# Patient Record
Sex: MALE | Race: WHITE | Employment: OTHER | URBAN - NONMETROPOLITAN AREA
[De-identification: names, ages, dates, MRNs, and addresses within clinical notes are randomized per-mention and may not be internally consistent; named-entity substitution may affect disease eponyms.]

---

## 2024-04-21 ENCOUNTER — APPOINTMENT (OUTPATIENT)
Dept: GENERAL RADIOLOGY | Age: 84
DRG: 378 | End: 2024-04-21
Attending: INTERNAL MEDICINE
Payer: MEDICARE

## 2024-04-21 ENCOUNTER — HOSPITAL ENCOUNTER (INPATIENT)
Age: 84
LOS: 2 days | Discharge: HOME | DRG: 378 | End: 2024-04-23
Attending: INTERNAL MEDICINE | Admitting: INTERNAL MEDICINE
Payer: MEDICARE

## 2024-04-21 DIAGNOSIS — D62 ACUTE BLOOD LOSS ANEMIA: Primary | ICD-10-CM

## 2024-04-21 LAB
ABO: NORMAL
ALBUMIN SERPL BCG-MCNC: 2.3 G/DL (ref 3.5–5.1)
ALP SERPL-CCNC: 49 U/L (ref 38–126)
ALT SERPL W/O P-5'-P-CCNC: 9 U/L (ref 11–66)
ANION GAP SERPL CALC-SCNC: 8 MEQ/L (ref 8–16)
ANTIBODY SCREEN: NORMAL
AST SERPL-CCNC: 14 U/L (ref 5–40)
BILIRUB SERPL-MCNC: 1.1 MG/DL (ref 0.3–1.2)
BUN SERPL-MCNC: 24 MG/DL (ref 7–22)
CA-I BLD ISE-SCNC: 1.07 MMOL/L (ref 1.12–1.32)
CALCIUM SERPL-MCNC: 7.3 MG/DL (ref 8.5–10.5)
CHLORIDE SERPL-SCNC: 102 MEQ/L (ref 98–111)
CO2 SERPL-SCNC: 22 MEQ/L (ref 23–33)
CREAT SERPL-MCNC: 0.5 MG/DL (ref 0.4–1.2)
FERRITIN SERPL IA-MCNC: 220 NG/ML (ref 22–322)
GFR SERPL CREATININE-BSD FRML MDRD: > 90 ML/MIN/1.73M2
GLUCOSE SERPL-MCNC: 90 MG/DL (ref 70–108)
IRON SATN MFR SERPL: 16 % (ref 20–50)
IRON SERPL-MCNC: 28 UG/DL (ref 65–195)
LACTATE SERPL-SCNC: 1.1 MMOL/L (ref 0.5–2)
MAGNESIUM SERPL-MCNC: 2.1 MG/DL (ref 1.6–2.4)
OSMOLALITY SERPL: 282 MOSMOL/KG (ref 275–295)
POTASSIUM SERPL-SCNC: 4 MEQ/L (ref 3.5–5.2)
PROT SERPL-MCNC: 5 G/DL (ref 6.1–8)
RH FACTOR: NORMAL
SODIUM SERPL-SCNC: 132 MEQ/L (ref 135–145)
TIBC SERPL-MCNC: 179 UG/DL (ref 171–450)
TROPONIN, HIGH SENSITIVITY: 16 NG/L (ref 0–12)

## 2024-04-21 PROCEDURE — 85025 COMPLETE CBC W/AUTO DIFF WBC: CPT

## 2024-04-21 PROCEDURE — 71045 X-RAY EXAM CHEST 1 VIEW: CPT

## 2024-04-21 PROCEDURE — 99223 1ST HOSP IP/OBS HIGH 75: CPT

## 2024-04-21 PROCEDURE — 83550 IRON BINDING TEST: CPT

## 2024-04-21 PROCEDURE — 83935 ASSAY OF URINE OSMOLALITY: CPT

## 2024-04-21 PROCEDURE — 81001 URINALYSIS AUTO W/SCOPE: CPT

## 2024-04-21 PROCEDURE — C9113 INJ PANTOPRAZOLE SODIUM, VIA: HCPCS

## 2024-04-21 PROCEDURE — P9016 RBC LEUKOCYTES REDUCED: HCPCS

## 2024-04-21 PROCEDURE — 83605 ASSAY OF LACTIC ACID: CPT

## 2024-04-21 PROCEDURE — 83930 ASSAY OF BLOOD OSMOLALITY: CPT

## 2024-04-21 PROCEDURE — 6360000002 HC RX W HCPCS

## 2024-04-21 PROCEDURE — 36415 COLL VENOUS BLD VENIPUNCTURE: CPT

## 2024-04-21 PROCEDURE — 86850 RBC ANTIBODY SCREEN: CPT

## 2024-04-21 PROCEDURE — 1200000003 HC TELEMETRY R&B

## 2024-04-21 PROCEDURE — 84300 ASSAY OF URINE SODIUM: CPT

## 2024-04-21 PROCEDURE — 84484 ASSAY OF TROPONIN QUANT: CPT

## 2024-04-21 PROCEDURE — A4216 STERILE WATER/SALINE, 10 ML: HCPCS

## 2024-04-21 PROCEDURE — 82728 ASSAY OF FERRITIN: CPT

## 2024-04-21 PROCEDURE — 82330 ASSAY OF CALCIUM: CPT

## 2024-04-21 PROCEDURE — 80053 COMPREHEN METABOLIC PANEL: CPT

## 2024-04-21 PROCEDURE — 30233N1 TRANSFUSION OF NONAUTOLOGOUS RED BLOOD CELLS INTO PERIPHERAL VEIN, PERCUTANEOUS APPROACH: ICD-10-PCS | Performed by: INTERNAL MEDICINE

## 2024-04-21 PROCEDURE — 93005 ELECTROCARDIOGRAM TRACING: CPT

## 2024-04-21 PROCEDURE — 86901 BLOOD TYPING SEROLOGIC RH(D): CPT

## 2024-04-21 PROCEDURE — 83540 ASSAY OF IRON: CPT

## 2024-04-21 PROCEDURE — 86923 COMPATIBILITY TEST ELECTRIC: CPT

## 2024-04-21 PROCEDURE — 2580000003 HC RX 258

## 2024-04-21 PROCEDURE — 83735 ASSAY OF MAGNESIUM: CPT

## 2024-04-21 PROCEDURE — 86900 BLOOD TYPING SEROLOGIC ABO: CPT

## 2024-04-21 RX ORDER — ONDANSETRON 2 MG/ML
4 INJECTION INTRAMUSCULAR; INTRAVENOUS EVERY 6 HOURS PRN
Status: DISCONTINUED | OUTPATIENT
Start: 2024-04-21 | End: 2024-04-23 | Stop reason: HOSPADM

## 2024-04-21 RX ORDER — SODIUM CHLORIDE 0.9 % (FLUSH) 0.9 %
5-40 SYRINGE (ML) INJECTION EVERY 12 HOURS SCHEDULED
Status: DISCONTINUED | OUTPATIENT
Start: 2024-04-21 | End: 2024-04-23 | Stop reason: HOSPADM

## 2024-04-21 RX ORDER — SODIUM CHLORIDE 9 MG/ML
INJECTION, SOLUTION INTRAVENOUS PRN
Status: DISCONTINUED | OUTPATIENT
Start: 2024-04-21 | End: 2024-04-23 | Stop reason: HOSPADM

## 2024-04-21 RX ORDER — ACETAMINOPHEN 650 MG/1
650 SUPPOSITORY RECTAL EVERY 6 HOURS PRN
Status: DISCONTINUED | OUTPATIENT
Start: 2024-04-21 | End: 2024-04-23 | Stop reason: HOSPADM

## 2024-04-21 RX ORDER — ACETAMINOPHEN 325 MG/1
650 TABLET ORAL EVERY 6 HOURS PRN
Status: DISCONTINUED | OUTPATIENT
Start: 2024-04-21 | End: 2024-04-23 | Stop reason: HOSPADM

## 2024-04-21 RX ORDER — ONDANSETRON 4 MG/1
4 TABLET, ORALLY DISINTEGRATING ORAL EVERY 8 HOURS PRN
Status: DISCONTINUED | OUTPATIENT
Start: 2024-04-21 | End: 2024-04-23 | Stop reason: HOSPADM

## 2024-04-21 RX ORDER — SODIUM CHLORIDE, SODIUM LACTATE, POTASSIUM CHLORIDE, CALCIUM CHLORIDE 600; 310; 30; 20 MG/100ML; MG/100ML; MG/100ML; MG/100ML
INJECTION, SOLUTION INTRAVENOUS CONTINUOUS
Status: DISCONTINUED | OUTPATIENT
Start: 2024-04-21 | End: 2024-04-23 | Stop reason: HOSPADM

## 2024-04-21 RX ORDER — SODIUM CHLORIDE 0.9 % (FLUSH) 0.9 %
5-40 SYRINGE (ML) INJECTION PRN
Status: DISCONTINUED | OUTPATIENT
Start: 2024-04-21 | End: 2024-04-23 | Stop reason: HOSPADM

## 2024-04-21 RX ORDER — CALCIUM GLUCONATE 20 MG/ML
2000 INJECTION, SOLUTION INTRAVENOUS PRN
Status: DISCONTINUED | OUTPATIENT
Start: 2024-04-21 | End: 2024-04-23 | Stop reason: HOSPADM

## 2024-04-21 RX ADMIN — SODIUM CHLORIDE, POTASSIUM CHLORIDE, SODIUM LACTATE AND CALCIUM CHLORIDE: 600; 310; 30; 20 INJECTION, SOLUTION INTRAVENOUS at 20:57

## 2024-04-21 RX ADMIN — PANTOPRAZOLE SODIUM 80 MG: 40 INJECTION, POWDER, FOR SOLUTION INTRAVENOUS at 20:41

## 2024-04-21 NOTE — H&P
Hospitalist History & Physical    Patient:  Tian Schmitz    Unit/Bed:6K-19/019-A  YOB: 1940  MRN: [id number]   Acct: [id number]   PCP: Antony Garcia  Code Status: No Order    Date of Service: Pt seen/examined on 04/21/24 and admitted to Inpatient with expected LOS greater than two midnights due to medical therapy.     Chief Complaint: dark stools x2 days    Assessment/Plan:    Acute blood loss anemia secondary to concern for upper GI bleed: Reports dark stools x3 weeks with associated constipation, poor appetite, and weight loss. Reports feeling lightheaded and SOB prior to blood transfusion. Hgb 5.9 on arrival to OSH, received 2 units PRBCs. BUN elevated. 3/2024 Cologuard screening positive.   Repeat CBC pending  H&H Q6H  Protonix BID  Clear liquid diet-No red dye, NPO at MN  GI consult in AM  PRN Antiemetics  Type and Screen  Blood consent obtained  Fall precautions    Hyponatremia, mild: Na 132. Likely due to poor intake.   UA and lytes pending  LR at 150 continuous  Repeat BMP in AM    Unintentional weight loss: Reports losing around 30lbs since January. Likely related to above causing decreased appetite.   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 Asymptomatic.   Telemetry monitoring  Follow up with Cardiology as OP for monitoring        DVT prophylaxis: SCD's  Diet: No diet orders on file  PT/OT: No  Tele: Yes  Code Status: No Order      History of Present Illness:  Tian Schmitz is a 83 y.o. male with no known PMHx who presented to [facility] with chief complaint of dark stools. [facility]. Patient reports that back in September he had a high dose flu shot that caused severe chills and body aches. Reports that since that time he has gone down hill. Reports having dark stools x3 weeks. States he is constipated and has to work at having a BM. Reports that he has lost about 30lbs

## 2024-04-22 ENCOUNTER — ANESTHESIA (OUTPATIENT)
Dept: ENDOSCOPY | Age: 84
DRG: 378 | End: 2024-04-22
Payer: MEDICARE

## 2024-04-22 ENCOUNTER — ANESTHESIA EVENT (OUTPATIENT)
Dept: ENDOSCOPY | Age: 84
DRG: 378 | End: 2024-04-22
Payer: MEDICARE

## 2024-04-22 LAB
ANION GAP SERPL CALC-SCNC: 7 MEQ/L (ref 8–16)
APTT PPP: 26.5 SECONDS (ref 22–38)
AUTO DIFF PNL BLD: ABNORMAL
BACTERIA: NORMAL
BASOPHILS ABSOLUTE: 0.1 THOU/MM3 (ref 0–0.1)
BASOPHILS NFR BLD AUTO: 0.4 %
BILIRUB UR QL STRIP: NEGATIVE
BUN SERPL-MCNC: 18 MG/DL (ref 7–22)
CALCIUM SERPL-MCNC: 7.4 MG/DL (ref 8.5–10.5)
CASTS #/AREA URNS LPF: NORMAL /LPF
CASTS #/AREA URNS LPF: NORMAL /LPF
CHARACTER UR: CLEAR
CHARCOAL URNS QL MICRO: NORMAL
CHLORIDE SERPL-SCNC: 105 MEQ/L (ref 98–111)
CO2 SERPL-SCNC: 22 MEQ/L (ref 23–33)
COLOR UR: YELLOW
CREAT SERPL-MCNC: 0.5 MG/DL (ref 0.4–1.2)
CRYSTALS URNS QL MICRO: NORMAL
DEPRECATED RDW RBC AUTO: 49 FL (ref 35–45)
EOSINOPHIL NFR BLD AUTO: 0.7 %
EOSINOPHILS ABSOLUTE: 0.1 THOU/MM3 (ref 0–0.4)
EPITHELIAL CELLS, UA: NORMAL /HPF
ERYTHROCYTE [DISTWIDTH] IN BLOOD BY AUTOMATED COUNT: 17 % (ref 11.5–14.5)
GFR SERPL CREATININE-BSD FRML MDRD: > 90 ML/MIN/1.73M2
GLUCOSE SERPL-MCNC: 87 MG/DL (ref 70–108)
GLUCOSE UR QL STRIP.AUTO: NEGATIVE MG/DL
HCT VFR BLD AUTO: 20.3 % (ref 42–52)
HCT VFR BLD AUTO: 20.5 % (ref 42–52)
HCT VFR BLD AUTO: 25.9 % (ref 42–52)
HCT VFR BLD AUTO: 26.8 % (ref 42–52)
HCT VFR BLD AUTO: 27.2 % (ref 42–52)
HGB BLD-MCNC: 6.4 GM/DL (ref 14–18)
HGB BLD-MCNC: 6.5 GM/DL (ref 14–18)
HGB BLD-MCNC: 8.6 GM/DL (ref 14–18)
HGB BLD-MCNC: 8.8 GM/DL (ref 14–18)
HGB BLD-MCNC: 9 GM/DL (ref 14–18)
HGB UR QL STRIP.AUTO: NEGATIVE
IMM GRANULOCYTES # BLD AUTO: 0.08 THOU/MM3 (ref 0–0.07)
IMM GRANULOCYTES NFR BLD AUTO: 0.6 %
INR PPP: 1.06 (ref 0.85–1.13)
KETONES UR QL STRIP.AUTO: NEGATIVE
LEUKOCYTE ESTERASE UR QL STRIP.AUTO: NEGATIVE
LYMPHOCYTES ABSOLUTE: 0.9 THOU/MM3 (ref 1–4.8)
LYMPHOCYTES NFR BLD AUTO: 6.9 %
MCH RBC QN AUTO: 25.4 PG (ref 26–33)
MCHC RBC AUTO-ENTMCNC: 31.2 GM/DL (ref 32.2–35.5)
MCV RBC AUTO: 81.3 FL (ref 80–94)
MONOCYTES ABSOLUTE: 1.2 THOU/MM3 (ref 0.4–1.3)
MONOCYTES NFR BLD AUTO: 8.8 %
NEUTROPHILS NFR BLD AUTO: 82.6 %
NITRITE UR QL STRIP.AUTO: NEGATIVE
NRBC BLD AUTO-RTO: 0 /100 WBC
OSMOLALITY UR: 638 MOSMOL/KG (ref 250–750)
PATHOLOGIST REVIEW: ABNORMAL
PH UR STRIP.AUTO: 6 [PH] (ref 5–9)
PLATELET # BLD AUTO: 279 THOU/MM3 (ref 130–400)
PLATELET BLD QL SMEAR: ADEQUATE
PMV BLD AUTO: 9.4 FL (ref 9.4–12.4)
POLYCHROMASIA BLD QL SMEAR: ABNORMAL
POTASSIUM SERPL-SCNC: 4 MEQ/L (ref 3.5–5.2)
PROT UR STRIP.AUTO-MCNC: NEGATIVE MG/DL
RBC # BLD AUTO: 2.52 MILL/MM3 (ref 4.7–6.1)
RBC #/AREA URNS HPF: NORMAL /HPF
RENAL EPI CELLS #/AREA URNS HPF: NORMAL /[HPF]
SCAN OF BLOOD SMEAR: NORMAL
SEGMENTED NEUTROPHILS ABSOLUTE COUNT: 10.8 THOU/MM3 (ref 1.8–7.7)
SODIUM SERPL-SCNC: 134 MEQ/L (ref 135–145)
SODIUM UR-SCNC: 41 MEQ/L
SPECIFIC GRAVITY UA: 1.02 (ref 1–1.03)
STOMATOCYTES: ABNORMAL
TROPONIN, HIGH SENSITIVITY: 18 NG/L (ref 0–12)
UROBILINOGEN, URINE: 0.2 EU/DL (ref 0–1)
WBC # BLD AUTO: 13.1 THOU/MM3 (ref 4.8–10.8)
WBC #/AREA URNS HPF: NORMAL /HPF
YEAST LIKE FUNGI URNS QL MICRO: NORMAL

## 2024-04-22 PROCEDURE — 6360000002 HC RX W HCPCS: Performed by: REGISTERED NURSE

## 2024-04-22 PROCEDURE — 99232 SBSQ HOSP IP/OBS MODERATE 35: CPT | Performed by: PHYSICIAN ASSISTANT

## 2024-04-22 PROCEDURE — 85730 THROMBOPLASTIN TIME PARTIAL: CPT

## 2024-04-22 PROCEDURE — 36415 COLL VENOUS BLD VENIPUNCTURE: CPT

## 2024-04-22 PROCEDURE — 7100000010 HC PHASE II RECOVERY - FIRST 15 MIN: Performed by: INTERNAL MEDICINE

## 2024-04-22 PROCEDURE — 88305 TISSUE EXAM BY PATHOLOGIST: CPT

## 2024-04-22 PROCEDURE — 3609012400 HC EGD TRANSORAL BIOPSY SINGLE/MULTIPLE: Performed by: INTERNAL MEDICINE

## 2024-04-22 PROCEDURE — 6360000002 HC RX W HCPCS: Performed by: NURSE PRACTITIONER

## 2024-04-22 PROCEDURE — 2580000003 HC RX 258

## 2024-04-22 PROCEDURE — 2580000003 HC RX 258: Performed by: NURSE PRACTITIONER

## 2024-04-22 PROCEDURE — 3700000001 HC ADD 15 MINUTES (ANESTHESIA): Performed by: INTERNAL MEDICINE

## 2024-04-22 PROCEDURE — 1200000003 HC TELEMETRY R&B

## 2024-04-22 PROCEDURE — 80048 BASIC METABOLIC PNL TOTAL CA: CPT

## 2024-04-22 PROCEDURE — 2500000003 HC RX 250 WO HCPCS: Performed by: REGISTERED NURSE

## 2024-04-22 PROCEDURE — 6370000000 HC RX 637 (ALT 250 FOR IP): Performed by: NURSE PRACTITIONER

## 2024-04-22 PROCEDURE — 0DB68ZX EXCISION OF STOMACH, VIA NATURAL OR ARTIFICIAL OPENING ENDOSCOPIC, DIAGNOSTIC: ICD-10-PCS | Performed by: INTERNAL MEDICINE

## 2024-04-22 PROCEDURE — 85018 HEMOGLOBIN: CPT

## 2024-04-22 PROCEDURE — 85014 HEMATOCRIT: CPT

## 2024-04-22 PROCEDURE — 85610 PROTHROMBIN TIME: CPT

## 2024-04-22 PROCEDURE — 36430 TRANSFUSION BLD/BLD COMPNT: CPT

## 2024-04-22 PROCEDURE — C9113 INJ PANTOPRAZOLE SODIUM, VIA: HCPCS | Performed by: NURSE PRACTITIONER

## 2024-04-22 PROCEDURE — 3700000000 HC ANESTHESIA ATTENDED CARE: Performed by: INTERNAL MEDICINE

## 2024-04-22 PROCEDURE — 93010 ELECTROCARDIOGRAM REPORT: CPT | Performed by: INTERNAL MEDICINE

## 2024-04-22 RX ORDER — PANTOPRAZOLE SODIUM 40 MG/1
40 TABLET, DELAYED RELEASE ORAL
Qty: 30 TABLET | Refills: 3 | Status: SHIPPED | OUTPATIENT
Start: 2024-04-23

## 2024-04-22 RX ORDER — SODIUM CHLORIDE 9 MG/ML
INJECTION, SOLUTION INTRAVENOUS PRN
Status: COMPLETED | OUTPATIENT
Start: 2024-04-22 | End: 2024-04-22

## 2024-04-22 RX ORDER — PANTOPRAZOLE SODIUM 40 MG/1
40 TABLET, DELAYED RELEASE ORAL
Status: DISCONTINUED | OUTPATIENT
Start: 2024-04-23 | End: 2024-04-23 | Stop reason: HOSPADM

## 2024-04-22 RX ORDER — POLYETHYLENE GLYCOL 3350 17 G/17G
17 POWDER, FOR SOLUTION ORAL 2 TIMES DAILY
Status: DISCONTINUED | OUTPATIENT
Start: 2024-04-22 | End: 2024-04-23 | Stop reason: HOSPADM

## 2024-04-22 RX ORDER — PROPOFOL 10 MG/ML
INJECTION, EMULSION INTRAVENOUS PRN
Status: DISCONTINUED | OUTPATIENT
Start: 2024-04-22 | End: 2024-04-22 | Stop reason: SDUPTHER

## 2024-04-22 RX ORDER — LIDOCAINE HYDROCHLORIDE 20 MG/ML
INJECTION, SOLUTION EPIDURAL; INFILTRATION; INTRACAUDAL; PERINEURAL PRN
Status: DISCONTINUED | OUTPATIENT
Start: 2024-04-22 | End: 2024-04-22 | Stop reason: SDUPTHER

## 2024-04-22 RX ORDER — DOCUSATE SODIUM 100 MG/1
100 CAPSULE, LIQUID FILLED ORAL 2 TIMES DAILY
Status: DISCONTINUED | OUTPATIENT
Start: 2024-04-22 | End: 2024-04-23 | Stop reason: HOSPADM

## 2024-04-22 RX ORDER — SENNOSIDES A AND B 8.6 MG/1
2 TABLET, FILM COATED ORAL NIGHTLY
Status: DISCONTINUED | OUTPATIENT
Start: 2024-04-22 | End: 2024-04-23 | Stop reason: HOSPADM

## 2024-04-22 RX ADMIN — SENNOSIDES 17.2 MG: 8.6 TABLET, FILM COATED ORAL at 21:33

## 2024-04-22 RX ADMIN — DOCUSATE SODIUM 100 MG: 100 CAPSULE, LIQUID FILLED ORAL at 12:50

## 2024-04-22 RX ADMIN — PROPOFOL 100 MG: 10 INJECTION, EMULSION INTRAVENOUS at 15:14

## 2024-04-22 RX ADMIN — SODIUM CHLORIDE, PRESERVATIVE FREE 10 ML: 5 INJECTION INTRAVENOUS at 21:33

## 2024-04-22 RX ADMIN — SODIUM CHLORIDE: 9 INJECTION, SOLUTION INTRAVENOUS at 01:12

## 2024-04-22 RX ADMIN — SODIUM CHLORIDE, POTASSIUM CHLORIDE, SODIUM LACTATE AND CALCIUM CHLORIDE: 600; 310; 30; 20 INJECTION, SOLUTION INTRAVENOUS at 21:36

## 2024-04-22 RX ADMIN — SODIUM CHLORIDE, PRESERVATIVE FREE 10 ML: 5 INJECTION INTRAVENOUS at 11:12

## 2024-04-22 RX ADMIN — DOCUSATE SODIUM 100 MG: 100 CAPSULE, LIQUID FILLED ORAL at 21:33

## 2024-04-22 RX ADMIN — LIDOCAINE HYDROCHLORIDE 100 MG: 20 INJECTION, SOLUTION EPIDURAL; INFILTRATION; INTRACAUDAL; PERINEURAL at 15:14

## 2024-04-22 RX ADMIN — PANTOPRAZOLE SODIUM 8 MG/HR: 40 INJECTION, POWDER, FOR SOLUTION INTRAVENOUS at 11:12

## 2024-04-22 RX ADMIN — SODIUM CHLORIDE 300 MG: 9 INJECTION, SOLUTION INTRAVENOUS at 12:50

## 2024-04-22 RX ADMIN — POLYETHYLENE GLYCOL 3350 17 G: 17 POWDER, FOR SOLUTION ORAL at 17:34

## 2024-04-22 ASSESSMENT — PAIN - FUNCTIONAL ASSESSMENT
PAIN_FUNCTIONAL_ASSESSMENT: NONE - DENIES PAIN

## 2024-04-22 NOTE — ANESTHESIA PRE PROCEDURE
Department of Anesthesiology  Preprocedure Note       Name:  Tian Schmitz   Age:  83 y.o.  :  1940                                          MRN:  [id number]         Date:  2024      Surgeon: Surgeon(s):  Geovanni Lynne MD    Procedure: Procedure(s):  EGD    Medications prior to admission:   Prior to Admission medications    Not on File       Current medications:    Current Facility-Administered Medications   Medication Dose Route Frequency Provider Last Rate Last Admin    pantoprazole (PROTONIX) 80 mg in sodium chloride 0.9 % 100 mL infusion  8 mg/hr IntraVENous Continuous Molly Rosado APRN - CNP 10 mL/hr at 24 1112 8 mg/hr at 24 1112    polyethylene glycol (GLYCOLAX) packet 17 g  17 g Oral BID Molly Rosado APRN - CNP        senna (SENOKOT) tablet 17.2 mg  2 tablet Oral Nightly Molly Rosado APRN - CNP        docusate sodium (COLACE) capsule 100 mg  100 mg Oral BID Molly Rosado APRN - CNP   100 mg at 24 1250    sodium chloride flush 0.9 % injection 5-40 mL  5-40 mL IntraVENous 2 times per day Dianne Velazquez APRN - CNP   10 mL at 24 1112    sodium chloride flush 0.9 % injection 5-40 mL  5-40 mL IntraVENous PRN Dianne Velazquez APRN - CNP        0.9 % sodium chloride infusion   IntraVENous PRN Dianne Velazquez APRN - JEFF        ondansetron (ZOFRAN-ODT) disintegrating tablet 4 mg  4 mg Oral Q8H PRN Dianne Velazquez APRN - CNP        Or    ondansetron (ZOFRAN) injection 4 mg  4 mg IntraVENous Q6H PRN Dianne Velazquez APRN - JEFF        acetaminophen (TYLENOL) tablet 650 mg  650 mg Oral Q6H PRN Dianne Velazquez APRN - CNP        Or    acetaminophen (TYLENOL) suppository 650 mg  650 mg Rectal Q6H PRN Dianne Velazquez APRN - CNP        lactated ringers IV soln infusion   IntraVENous Continuous Dianne Velazquez APRN -  mL/hr at 24 New Bag at 24    calcium gluconate 2,000 mg in sodium chloride 100 mL  2,000 mg

## 2024-04-22 NOTE — ANESTHESIA POSTPROCEDURE EVALUATION
Department of Anesthesiology  Postprocedure Note    Patient: Tian Schmitz  MRN: [id number]  YOB: 1940  Date of evaluation: 4/22/2024    Procedure Summary       Date: 04/22/24 Room / Location: [place] [address] / [facility]    Anesthesia Start: 1511 Anesthesia Stop: 1524    Procedure: ESOPHAGOGASTRODUODENOSCOPY BIOPSY Diagnosis:       Melena      Anemia, unspecified type      (Melena [K92.1])      (Anemia, unspecified type [D64.9])    Surgeons: Geovanni Lynne MD Responsible Provider: Panda Newell MD    Anesthesia Type: MAC ASA Status: 1            Anesthesia Type: No value filed.    Carter Phase I: Carter Score: 10    Carter Phase II:      Anesthesia Post Evaluation    Patient location during evaluation: bedside  Patient participation: complete - patient participated  Level of consciousness: awake and alert  Airway patency: patent  Nausea & Vomiting: no nausea and no vomiting  Cardiovascular status: hemodynamically stable and blood pressure returned to baseline  Respiratory status: acceptable, spontaneous ventilation, nonlabored ventilation and nasal cannula  Hydration status: stable  Pain management: adequate        No notable events documented.

## 2024-04-22 NOTE — PROGRESS NOTES
Hospitalist Progress Note    Patient:  Tian Schmitz      Unit/Bed:6K-19/019-A    YOB: 1940    MRN: [id number]       Acct: [id number]     PCP: Antony Garcia    Date of Admission: 4/21/2024    Assessment/Plan:    Acute blood loss anemia secondary to concern for upper GI bleed: Reports dark stools x3 weeks with associated constipation, poor appetite, and weight loss. Reports feeling lightheaded and SOB prior to blood transfusion. Hgb 5.9 on arrival to OSH, received 2 units PRBCs. BUN elevated. 3/2024 Cologuard screening positive.   Repeat CBC pending  H&H Q6H  Protonix drip  NPO   GI following and completing EGD urgently today   Additional 2 units PRBC transfused here.  Post transfusion Hgb 8.8  IV Venofer once   Colonoscopy IP vs OP depending on course due to concerns for malignancy given presenting symptoms      Hyponatremia, mild- improved   Continue IVF and monitoring     Unintentional weight loss:   Concerns for colon malignancy given anemia, + cologuard and symptoms   GI following- will pursue colonoscopy IP vs OP   Reports losing around 30lbs since January. Likely related to above causing decreased appetite.   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 Asymptomatic.   Telemetry monitoring  Follow up with Cardiology as OP for monitoring    LDA: []CVC / []PICC / []Midline / []Samson / []Drains / []Mediport / [x]None  Antibiotics: no  Steroids: no  Labs (still needed?): [x]Yes / []No  IVF (still needed?): [x]Yes / []No    Level of care: []Step Down / [x]Med-Surg  Bed Status: [x]Inpatient / []Observation  Telemetry: [x]Yes / []No  PT/OT: []Yes / [x]No    DVT Prophylaxis: [] Lovenox / [] Heparin / [x] SCDs / [] Already on Systemic Anticoagulation / [] None      Expected discharge date:  pending course     Disposition:    [x] Home       [] TCU       [] Rehab       [] Psych       [] SNF       [] Long Term Care

## 2024-04-22 NOTE — PROGRESS NOTES
Recovery mode. Patient denies discomfort. Passing gas, taking fluids. Dr. Lynne discussed findings with patient. Report called to Nancy RN on 6K. Discharge instructions provided and understanding verbalized.

## 2024-04-22 NOTE — PROGRESS NOTES
No signs of reaction; vitals stable after completion of 1st unit of blood      5: 15 : 2nd unit of blood started   Nurse at bedside for 15 min

## 2024-04-22 NOTE — H&P
[facility]    Pre-Endoscopy H&PE      Patient: Tian Schmitz    : 1940    Acct#: [id number]  Primary Care Physician: Antony Garcia   Date of evaluation: 2024    Planned Procedure:    [x]EGD    []Enteroscopy    []PEG    []PEG change    []PEG removal  []Variceal banding    []Biopsy   []Dilation      [x]Control of bleeding  []Destruction of lesion by APC    []Stent Placement  []Foreign Body Removal    []Snare Polypectomy  []Other:         Planned Sedation  []Conscious Sedation [x]MAC/Propofol []Anesthesia    []None    Airway:  Adequate for planned sedation    Indication:   melena, YORDY    History:  The patient is a 83 y.o. male with recent +Cologuard test admitted 24 for melena and profound anemia.      Physical Exam:  VITALS: BP (!) 154/72   Pulse 76   Temp 97.9 °F (36.6 °C) (Oral)   Resp 15   Ht 1.854 m (6' 1\")   Wt 69.3 kg (152 lb 11.2 oz)   SpO2 97%   BMI 20.15 kg/m²   The patient is a 83 y.o. male in no acute distress.  HEAD: Normal cephalic/atraumatic. Extra-occular motions intact bilaterally.  NECK: No lymphadenopathy or bruits.  CHEST: Rises equally on inspiration. Clear to auscultation bilaterally.   CARDIOVASCULAR: Regular rate and rhythm without murmurs, rubs or gallops.   ABDOMEN: Soft, nontender, and nondistended with normal bowel sounds. No Hepatosplemomegaly.  UPPER EXTREMITIES: no cyanosis, clubbing, or edema.  DERM: no rash or jaundice.  LOWER EXTREMITIES: no cyanosis, clubbing, or edema.  NEURO: Alert and oriented times four. Patient moves all extremities and has gross sensation in all extremities.    ASA: ASA 1 - Normal health patient    See GI consult dated 24    Geovanni Lynne MD  3:08 PM 2024

## 2024-04-22 NOTE — CARE COORDINATION
Case Management Assessment Initial Evaluation    Date/Time of Evaluation: 2024 8:15 AM  Assessment Completed by: Manolo Chan RN    If patient is discharged prior to next notation, then this note serves as note for discharge by case management.    Patient Name: Tian Schmitz                   YOB: 1940  Diagnosis: Acute blood loss anemia [D62]                   Date / Time: 2024  6:18 PM  Location: [address] [place] [address]     Patient Admission Status: Inpatient   Readmission Risk Low 0-14, Mod 15-19), High > 20: No data recorded  Current PCP: Antony Garcia    Additional Case Management Notes: Hgb 6.5, ical 1.07, WBC 13.1, trops mildly elev, iron 28LR infusion, IV protonix. NPO with GI consult pending. Update: EGD today.       Procedures:  PRBC transfusion    Imagin/21 cxray:  Ill-defined opacities in the right upper lobe secondary to confluence   artifact or infiltrates.    Patient Goals/Plan/Treatment Preferences: Met with Tian and his wife. Tian is fully independent and still works 3-4 days per week. He plans to return home at discharge and denies all needs at this time.        24 1048   Service Assessment   Patient Orientation Alert and Oriented   Cognition Alert   History Provided By Patient;Spouse   Primary Caregiver Self   Support Systems Spouse/Significant Other;Family Members   Patient's Healthcare Decision Maker is: Legal Next of Kin   PCP Verified by CM Yes   Prior Functional Level Independent in ADLs/IADLs   Current Functional Level Independent in ADLs/IADLs   Can patient return to prior living arrangement Yes   Ability to make needs known: Good   Family able to assist with home care needs: Yes   Would you like for me to discuss the discharge plan with any other family members/significant others, and if so, who? No  (wife present)   Financial Resources Medicare   Community Resources None   Social/Functional History   Active  Yes   Discharge Planning

## 2024-04-22 NOTE — DISCHARGE INSTRUCTIONS
You were diagnosed with a stomach ulcer that was bleeding and caused you to become anemic.  You were given several blood transfusions to replace your blood levels.   You underwent endoscopy which showed the bleeding ulcers.  Samples of your stomach were taken and sent to the lab for further analysis.  This will be reviewed at your follow up with Dr Lynne. Please take the prescribed pantoprazole daily.   You will need a repeat endoscopy in 8 weeks to confirm healing of ulcer

## 2024-04-22 NOTE — PROGRESS NOTES
EGD complete, photos taken, 1 specimen taken and 1 jar labeled and sent to lab for processing. Pt tolerated procedure well    Scope #  used.

## 2024-04-22 NOTE — CONSULTS
benefits discussed, all questions answered, patient is agreeable to proceed  Will schedule for urgent EGD today with Dr Lynne  Nursing to monitor stool output & document  Colace BID  Miralax BID  Senna 2 tabs nightly  IV Venofer once  Will need a colonoscopy IP vs OP, timing to be determined by clinical course- anemia & + Cologuard  RN updated  Supportive care per primary team  Will follow       Case reviewed and impression/plan reviewed in collaboration with Dr. Lynne  Electronically signed by RASHARD Urbina - CNP on 4/22/2024 at 9:36 AM    [facility]  Thank you for the consultation.

## 2024-04-22 NOTE — CONSENT
Informed Consent for Blood Component Transfusion Note    I have discussed with the patient the rationale for blood component transfusion; its benefits in treating or preventing fatigue, organ damage, or death; and its risk which includes mild transfusion reactions, rare risk of blood borne infection, or more serious but rare reactions. I have discussed the alternatives to transfusion, including the risk and consequences of not receiving transfusion. The patient had an opportunity to ask questions and had agreed to proceed with transfusion of blood components.    Electronically signed by RASHARD Gil CNP on 4/21/24 at 8:28 PM EDT

## 2024-04-22 NOTE — PLAN OF CARE
Problem: Discharge Planning  Goal: Discharge to home or other facility with appropriate resources  4/22/2024 0049 by Christiana Villalobos, RN  Outcome: Progressing  4/22/2024 0048 by Christiana Villalobos, BEATRIZ  Outcome: Progressing  Flowsheets (Taken 4/21/2024 4615 by Deisy Awan, RN)  Discharge to home or other facility with appropriate resources: Identify barriers to discharge with patient and caregiver     Problem: Safety - Adult  Goal: Free from fall injury  Outcome: Progressing     Problem: Pain  Goal: Verbalizes/displays adequate comfort level or baseline comfort level  Outcome: Progressing     Problem: Gastrointestinal - Adult  Goal: Minimal or absence of nausea and vomiting  Outcome: Progressing     Problem: Gastrointestinal - Adult  Goal: Maintains or returns to baseline bowel function  Outcome: Progressing     Problem: Gastrointestinal - Adult  Goal: Maintains adequate nutritional intake  Outcome: Progressing     Problem: Gastrointestinal - Adult  Goal: Establish and maintain optimal ostomy function  Outcome: Progressing

## 2024-04-22 NOTE — PROGRESS NOTES
2nd unit blood completed. No s/s of distress. No complication/reaction. Vitals stable after infusion

## 2024-04-22 NOTE — PROGRESS NOTES
8: 30 pm : Patient agreeable to blood transfusion this evening; NP Dianne Velazquez at bed side to confirm agreement and sign blood consent. 2 units of blood order. Will continue to monitor.     01:28 :  1st unit blood started ; vitals stable . RN at bedside first 15 min. No reactions noted. Respirations easy and unlabored.     1:43: 15 min observation completed. No reaction noted. Vitals temp 98.3; hr 77; bp : 113/66; oxygen 99%. Respiration 18 easy and unlabored. No s/s of reaction noted at this time. Pt stable.

## 2024-04-22 NOTE — OP NOTE
[facility]    Patient: Tian Schmitz  : 1940  [id number]  Date of evaluation: 2024  Primary Care Physician: Antony Garcia     Procedure:    [x]EGD    []Enteroscopy    [x]Biopsy   []Dilation      []PEG    []PEG change    []PEG removal  []Variceal banding    []Control of bleeding  []Destruction of lesion by APC    []Stent Placement  []Foreign Body Removal    []Snare Polypectomy  []Other:     []Aborted:        Indication:   melena, YORDY    History:  The patient is a 83 y.o. male with recent +Cologuard test admitted 24 for melena and profound anemia.      Physical Exam:  VITALS: BP (!) 154/72   Pulse 76   Temp 97.9 °F (36.6 °C) (Oral)   Resp 15   Ht 1.854 m (6' 1\")   Wt 69.3 kg (152 lb 11.2 oz)   SpO2 97%   BMI 20.15 kg/m²   The patient is a 83 y.o. male in no acute distress.  HEAD: Normal cephalic/atraumatic. Extra-occular motions intact bilaterally.  NECK: No lymphadenopathy or bruits.  CHEST: Rises equally on inspiration. Clear to auscultation bilaterally.   CARDIOVASCULAR: Regular rate and rhythm without murmurs, rubs or gallops.   ABDOMEN: Soft, nontender, and nondistended with normal bowel sounds. No Hepatosplemomegaly.  UPPER EXTREMITIES: no cyanosis, clubbing, or edema.  DERM: no rash or jaundice.  LOWER EXTREMITIES: no cyanosis, clubbing, or edema.  NEURO: Alert and oriented times four. Patient moves all extremities and has gross sensation in all extremities.    ASA: ASA 1 - Normal health patient    MEDICATION:    MAC/Propofol/Anesthesia:  Yes     Photo:  Yes  Biopsy:  Yes      Description of Procedure:  The risks and benefits of the procedure were described to the patient or their representative if unable to give consent including but not limited to bleeding, infection, poking a hole someplace requiring surgery to fix it, having a reaction to medication, and death. The patient or their representative if unable to

## 2024-04-23 VITALS
RESPIRATION RATE: 18 BRPM | OXYGEN SATURATION: 97 % | TEMPERATURE: 97.8 F | HEIGHT: 73 IN | SYSTOLIC BLOOD PRESSURE: 112 MMHG | BODY MASS INDEX: 19.2 KG/M2 | HEART RATE: 73 BPM | DIASTOLIC BLOOD PRESSURE: 65 MMHG | WEIGHT: 144.84 LBS

## 2024-04-23 PROBLEM — E44.0 MODERATE MALNUTRITION (HCC): Status: ACTIVE | Noted: 2024-04-23

## 2024-04-23 LAB
ANION GAP SERPL CALC-SCNC: 10 MEQ/L (ref 8–16)
BUN SERPL-MCNC: 11 MG/DL (ref 7–22)
CALCIUM SERPL-MCNC: 7.4 MG/DL (ref 8.5–10.5)
CHLORIDE SERPL-SCNC: 105 MEQ/L (ref 98–111)
CO2 SERPL-SCNC: 21 MEQ/L (ref 23–33)
CREAT SERPL-MCNC: 0.5 MG/DL (ref 0.4–1.2)
GFR SERPL CREATININE-BSD FRML MDRD: > 90 ML/MIN/1.73M2
GLUCOSE SERPL-MCNC: 81 MG/DL (ref 70–108)
HCT VFR BLD AUTO: 27 % (ref 42–52)
HCT VFR BLD AUTO: 28.2 % (ref 42–52)
HGB BLD-MCNC: 8.8 GM/DL (ref 14–18)
HGB BLD-MCNC: 9.1 GM/DL (ref 14–18)
POTASSIUM SERPL-SCNC: 3.8 MEQ/L (ref 3.5–5.2)
SODIUM SERPL-SCNC: 136 MEQ/L (ref 135–145)

## 2024-04-23 PROCEDURE — 80048 BASIC METABOLIC PNL TOTAL CA: CPT

## 2024-04-23 PROCEDURE — 6370000000 HC RX 637 (ALT 250 FOR IP): Performed by: NURSE PRACTITIONER

## 2024-04-23 PROCEDURE — 85018 HEMOGLOBIN: CPT

## 2024-04-23 PROCEDURE — 6370000000 HC RX 637 (ALT 250 FOR IP): Performed by: INTERNAL MEDICINE

## 2024-04-23 PROCEDURE — 85014 HEMATOCRIT: CPT

## 2024-04-23 PROCEDURE — 2580000003 HC RX 258

## 2024-04-23 PROCEDURE — 36415 COLL VENOUS BLD VENIPUNCTURE: CPT

## 2024-04-23 RX ORDER — PSEUDOEPHEDRINE HCL 30 MG
100 TABLET ORAL 2 TIMES DAILY
Qty: 60 CAPSULE | Refills: 1 | Status: SHIPPED | OUTPATIENT
Start: 2024-04-23

## 2024-04-23 RX ORDER — POLYETHYLENE GLYCOL 3350 17 G/17G
17 POWDER, FOR SOLUTION ORAL 2 TIMES DAILY
Qty: 527 G | Refills: 2 | Status: SHIPPED | OUTPATIENT
Start: 2024-04-23 | End: 2024-05-23

## 2024-04-23 RX ORDER — SENNOSIDES A AND B 8.6 MG/1
2 TABLET, FILM COATED ORAL NIGHTLY
Qty: 60 TABLET | Refills: 0 | Status: SHIPPED | OUTPATIENT
Start: 2024-04-23 | End: 2024-04-23

## 2024-04-23 RX ORDER — SENNOSIDES A AND B 8.6 MG/1
2 TABLET, FILM COATED ORAL NIGHTLY
Qty: 60 TABLET | Refills: 2 | Status: SHIPPED | OUTPATIENT
Start: 2024-04-23 | End: 2024-05-23

## 2024-04-23 RX ADMIN — SODIUM CHLORIDE, PRESERVATIVE FREE 10 ML: 5 INJECTION INTRAVENOUS at 08:40

## 2024-04-23 RX ADMIN — PANTOPRAZOLE SODIUM 40 MG: 40 TABLET, DELAYED RELEASE ORAL at 05:43

## 2024-04-23 RX ADMIN — SODIUM CHLORIDE, POTASSIUM CHLORIDE, SODIUM LACTATE AND CALCIUM CHLORIDE: 600; 310; 30; 20 INJECTION, SOLUTION INTRAVENOUS at 04:09

## 2024-04-23 RX ADMIN — POLYETHYLENE GLYCOL 3350 17 G: 17 POWDER, FOR SOLUTION ORAL at 08:40

## 2024-04-23 RX ADMIN — DOCUSATE SODIUM 100 MG: 100 CAPSULE, LIQUID FILLED ORAL at 08:40

## 2024-04-23 NOTE — PLAN OF CARE
Problem: Discharge Planning  Goal: Discharge to home or other facility with appropriate resources  Outcome: Progressing     Problem: Safety - Adult  Goal: Free from fall injury  4/22/2024 2317 by Myron Napoles RN  Outcome: Progressing  4/22/2024 1430 by Nancy Woodard RN  Outcome: Progressing     Problem: Pain  Goal: Verbalizes/displays adequate comfort level or baseline comfort level  4/22/2024 2317 by Myron Napoles RN  Outcome: Progressing  4/22/2024 1430 by Nancy Woodard RN  Outcome: Progressing     Problem: Gastrointestinal - Adult  Goal: Minimal or absence of nausea and vomiting  4/22/2024 2317 by Myron Napoles RN  Outcome: Progressing  4/22/2024 1430 by Nancy Woodard RN  Outcome: Progressing  Goal: Maintains or returns to baseline bowel function  4/22/2024 1430 by Nancy Woodard RN  Outcome: Progressing  Goal: Maintains adequate nutritional intake  4/22/2024 1430 by Nancy Woodard RN  Outcome: Progressing  Goal: Establish and maintain optimal ostomy function  4/22/2024 1430 by Nancy Woodard RN  Outcome: Progressing

## 2024-04-23 NOTE — PROGRESS NOTES
Comprehensive Nutrition Assessment    Type and Reason for Visit:  Initial, Consult (poor oral intake 5 days or more). Patient and wife seen prior to discharge today.    Nutrition Recommendations/Plan:   Consider MVI.  ONS recommended: Ensure Plus TID.  Home use reported and plan to continue at discharge as needed.   Continue current diet. Encouraged oral intake and good protein sources.      Malnutrition Assessment:  Malnutrition Status:  Moderate malnutrition (04/23/24 1357)    Context:  Chronic Illness     Findings of the 6 clinical characteristics of malnutrition:  Energy Intake:  75% or less estimated energy requirements for 1 month or longer   Weight Loss:     patient and wife report 14.8% loss unplanned in the last 6 months, no EMR weights to compare  Body Fat Loss:   (Moderate Body Fat Loss) Orbital, Triceps, Fat Overlying Ribs   Muscle Mass Loss:   (Moderate Muscle Mass Loss) Calf (gastrocnemius), Thigh (quadriceps), Temples (temporalis)  Fluid Accumulation:  No significant fluid accumulation     Strength:  Not Performed    Nutrition Assessment:     Pt. moderately malnourished AEB criteria as listed above.  At risk for further nutrition compromise r/t previous intermittent taste changes, smells caused nausea, early satiety, constipation, admit with acute blood loss anemia, melena, s/p 422/24: EGD-stomach ulcer found-biopsied.        Nutrition Related Findings:    Pt. Report/Treatments/Miscellaneous: Patient and wife seen.  Reports unplanned weight loss over the last 6 months due to taste changes, smells caused nausea, poor appetite, early satiety, \"it was probably that ulcer they found yesterday\". Reports doing much better today, ate a good breakfast, not much lunch but excited to go home.  Reports home Ensure use up to 3 per day prior to admit due to very poor oral intake and weight loss.  He thought he lost ~ 35# unplanned in the last 6 months.  Note GI planning colonoscopy as outpatient, concerns for

## 2024-04-23 NOTE — PLAN OF CARE
Problem: Discharge Planning  Goal: Discharge to home or other facility with appropriate resources  4/23/2024 1310 by Nancy Woodard RN  Outcome: Adequate for Discharge  4/22/2024 2317 by Myron Napoles RN  Outcome: Progressing     Problem: Safety - Adult  Goal: Free from fall injury  4/23/2024 1310 by Nancy Woodard RN  Outcome: Adequate for Discharge  4/22/2024 2317 by Myron Napoles RN  Outcome: Progressing     Problem: Pain  Goal: Verbalizes/displays adequate comfort level or baseline comfort level  4/23/2024 1310 by Nancy Woodard RN  Outcome: Adequate for Discharge  4/22/2024 2317 by Myron Napoles RN  Outcome: Progressing     Problem: Gastrointestinal - Adult  Goal: Minimal or absence of nausea and vomiting  4/23/2024 1310 by Nancy Woodard RN  Outcome: Adequate for Discharge  4/22/2024 2317 by Myron Napoles RN  Outcome: Progressing  Goal: Maintains or returns to baseline bowel function  Outcome: Adequate for Discharge  Goal: Maintains adequate nutritional intake  Outcome: Adequate for Discharge  Goal: Establish and maintain optimal ostomy function  Outcome: Adequate for Discharge

## 2024-04-23 NOTE — PROGRESS NOTES
Pt was in bed as his wife was with him. He was dealing with acute blood loss anemia. He was encouraged and blessed. He said that he will be going home today, he said.    04/23/24 1052   Encounter Summary   Encounter Overview/Reason  Initial Encounter   Service Provided For: Patient and family together   Referral/Consult From: [facility]   Support System Spouse   Last Encounter  04/23/24   Complexity of Encounter Low   Begin Time 0830   End Time  0835   Total Time Calculated 5 min   Spiritual/Emotional needs   Type Spiritual Support   Assessment/Intervention/Outcome   Assessment Hopeful   Intervention Empowerment

## 2024-04-23 NOTE — PROGRESS NOTES
Gastroenterology Progress Note:     Patient Name:  Tian Schmitz   MRN: [id number]  [id number]  YOB: 1940  Admit Date: 4/21/2024  6:18 PM  Primary Care Physician: Antony Garcia   6K-19/019-A     Patient seen and examined.  24 hours events and chart reviewed.    Subjective: Patient resting in bed, wife present. Denies abd pain, n/v. EGD results reviewed. Patient had a large BM today. Hgb 9.1    Objective:  /65   Pulse 73   Temp 97.8 °F (36.6 °C) (Oral)   Resp 18   Ht 1.854 m (6' 1\")   Wt 65.7 kg (144 lb 13.5 oz)   SpO2 97%   BMI 19.11 kg/m²     Physical Exam:    General:  Nourished in no distress  HEENT: Atraumatic, normocephalic. Moist oral mucous membranes.  Neck: Supple without adenopathy, JVD, thyromegaly or masses. Trachea midline.  CV: Heart RRR, no murmurs, rubs, gallops.  Resp: Even, easy without cough or accessory use. Lungs clear to ascultation bilaterally.   Abd: Round, soft, non-tender. No hepatosplenomegaly or mass present. Active bowel sounds heard. No distention noted.   Ext:  Without cyanosis, clubbing, edema.   Skin: Pink, warm, dry  Neuro:  Alert, oriented x 3 with no obvious deficits.       Rectal: deferred    Labs:   CBC:   Lab Results   Component Value Date/Time    WBC 13.1 04/21/2024 11:45 PM    HGB 9.1 04/23/2024 08:14 AM    HCT 28.2 04/23/2024 08:14 AM    MCV 81.3 04/21/2024 11:45 PM     04/21/2024 11:45 PM     BMP:   Lab Results   Component Value Date/Time     04/23/2024 08:14 AM    K 3.8 04/23/2024 08:14 AM     04/23/2024 08:14 AM    CO2 21 04/23/2024 08:14 AM    BUN 11 04/23/2024 08:14 AM    CREATININE 0.5 04/23/2024 08:14 AM    CALCIUM 7.4 04/23/2024 08:14 AM     PT/INR:   Lab Results   Component Value Date/Time    INR 1.06 04/22/2024 08:06 AM     Lipids:   Lab Results   Component Value Date/Time    ALKPHOS 49 04/21/2024 08:06 PM    ALT 9 04/21/2024 08:06 PM    AST 14 04/21/2024 08:06 PM    BILITOT 1.1 04/21/2024 08:06 PM

## 2024-04-23 NOTE — CARE COORDINATION
4/23/24, 1:28 PM EDT    Patient goals/plan/ treatment preferences discussed by  and .  Patient goals/plan/ treatment preferences reviewed with patient/ family.  Patient/ family verbalize understanding of discharge plan and are in agreement with goal/plan/treatment preferences.  Understanding was demonstrated using the teach back method.  AVS provided by RN at time of discharge, which includes all necessary medical information pertaining to the patients current course of illness, treatment, post-discharge goals of care, and treatment preferences. Discharged prior to CM rounding today. Manolo MARTIN did speak with Don yesterday, denied needs. He is independent and still works part-time. No new services or dme ordered.     Services At/After Discharge: None         negative...

## 2024-04-23 NOTE — DISCHARGE SUMMARY
Hospital Medicine Discharge Summary      Patient Identification:   Tian Schmitz   : 1940  MRN: [id number]   Account: [id number]      Patient's PCP: Antony Garcia    Admit Date: 2024     Discharge Date:   2024    Admitting Physician: No admitting provider for patient encounter.     Discharging Physician Assistant: Kiley Bustos PA-C     Discharge Diagnoses with Assessment/Plan:    Acute blood loss anemia secondary to bleeding stomach ulcer  Received a total of 4 units of blood between [facility] and our facility.   Hgb was monitored closely and has been stable in the 8-9s post transfusion   Protonix drip was given and patient to continue pantoprazole oral on discharge   EGD completed by Dr Lynne  demonstrating bleeding ulcer   IV Venofer once   Colonoscopy OP depending on course due to concerns for malignancy given presenting symptoms      Hyponatremia- resolved   Continue IVF and monitoring     Unintentional weight loss:   Concerns for colon malignancy given anemia, + cologuard and symptoms   GI following- will pursue colonoscopy OP   Reports losing around 30lbs since January. Likely related to above causing decreased appetite.   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 Asymptomatic.       The patient was seen and examined on day of discharge and this discharge summary is in conjunction with any daily progress note from day of discharge.    Hospital Course:   Tian Schmitz is a 83 y.o. male with no known PMHx who presented to [facility] with chief complaint of dark stools. Patient arrived as a direct admission from [facility]. Patient reports that back in September he had a high dose flu shot that caused severe chills and body aches. Reports that since that time he has gone down hill. Reports having dark stools x3 weeks. States he is constipated and has to work at having a BM. Reports that he has lost about 30lbs since

## 2024-04-24 LAB
EKG ATRIAL RATE: 80 BPM
EKG P AXIS: 10 DEGREES
EKG P-R INTERVAL: 214 MS
EKG Q-T INTERVAL: 408 MS
EKG QRS DURATION: 132 MS
EKG QTC CALCULATION (BAZETT): 470 MS
EKG R AXIS: -76 DEGREES
EKG T AXIS: 52 DEGREES
EKG VENTRICULAR RATE: 80 BPM

## 2024-04-30 ENCOUNTER — HOSPITAL ENCOUNTER (INPATIENT)
Age: 84
LOS: 3 days | Discharge: ANOTHER HOSPITAL | End: 2024-05-03
Admitting: INTERNAL MEDICINE
Payer: MEDICARE

## 2024-04-30 ENCOUNTER — APPOINTMENT (OUTPATIENT)
Dept: CT IMAGING | Age: 84
End: 2024-04-30
Payer: MEDICARE

## 2024-04-30 ENCOUNTER — APPOINTMENT (OUTPATIENT)
Dept: GENERAL RADIOLOGY | Age: 84
End: 2024-04-30
Payer: MEDICARE

## 2024-04-30 DIAGNOSIS — R10.13 EPIGASTRIC PAIN: Primary | ICD-10-CM

## 2024-04-30 DIAGNOSIS — D49.89: ICD-10-CM

## 2024-04-30 DIAGNOSIS — R11.2 NAUSEA AND VOMITING, UNSPECIFIED VOMITING TYPE: ICD-10-CM

## 2024-04-30 PROBLEM — R10.9 ABDOMINAL PAIN: Status: ACTIVE | Noted: 2024-04-30

## 2024-04-30 LAB
ABO: NORMAL
ALBUMIN SERPL BCG-MCNC: 2.9 G/DL (ref 3.5–5.1)
ALP SERPL-CCNC: 74 U/L (ref 38–126)
ALT SERPL W/O P-5'-P-CCNC: 11 U/L (ref 11–66)
ANION GAP SERPL CALC-SCNC: 13 MEQ/L (ref 8–16)
ANTIBODY SCREEN: NORMAL
AST SERPL-CCNC: 17 U/L (ref 5–40)
BASOPHILS ABSOLUTE: 0.1 THOU/MM3 (ref 0–0.1)
BASOPHILS NFR BLD AUTO: 0.4 %
BILIRUB SERPL-MCNC: 0.7 MG/DL (ref 0.3–1.2)
BUN SERPL-MCNC: 14 MG/DL (ref 7–22)
CALCIUM SERPL-MCNC: 8.6 MG/DL (ref 8.5–10.5)
CHLORIDE SERPL-SCNC: 98 MEQ/L (ref 98–111)
CO2 SERPL-SCNC: 21 MEQ/L (ref 23–33)
CREAT SERPL-MCNC: 0.5 MG/DL (ref 0.4–1.2)
DEPRECATED RDW RBC AUTO: 57.4 FL (ref 35–45)
EOSINOPHIL NFR BLD AUTO: 0.2 %
EOSINOPHILS ABSOLUTE: 0 THOU/MM3 (ref 0–0.4)
ERYTHROCYTE [DISTWIDTH] IN BLOOD BY AUTOMATED COUNT: 18.2 % (ref 11.5–14.5)
GFR SERPL CREATININE-BSD FRML MDRD: > 90 ML/MIN/1.73M2
GLUCOSE SERPL-MCNC: 110 MG/DL (ref 70–108)
HCT VFR BLD AUTO: 37 % (ref 42–52)
HGB BLD-MCNC: 11.7 GM/DL (ref 14–18)
IMM GRANULOCYTES # BLD AUTO: 0.07 THOU/MM3 (ref 0–0.07)
IMM GRANULOCYTES NFR BLD AUTO: 0.4 %
INR PPP: 1.07 (ref 0.85–1.13)
LACTIC ACID, SEPSIS: 1.4 MMOL/L (ref 0.5–1.9)
LIPASE SERPL-CCNC: 16.4 U/L (ref 5.6–51.3)
LYMPHOCYTES ABSOLUTE: 0.8 THOU/MM3 (ref 1–4.8)
LYMPHOCYTES NFR BLD AUTO: 4.5 %
MCH RBC QN AUTO: 27.1 PG (ref 26–33)
MCHC RBC AUTO-ENTMCNC: 31.6 GM/DL (ref 32.2–35.5)
MCV RBC AUTO: 85.6 FL (ref 80–94)
MONOCYTES ABSOLUTE: 1 THOU/MM3 (ref 0.4–1.3)
MONOCYTES NFR BLD AUTO: 5.3 %
NEUTROPHILS NFR BLD AUTO: 89.2 %
NRBC BLD AUTO-RTO: 0 /100 WBC
OSMOLALITY SERPL CALC.SUM OF ELEC: 265.6 MOSMOL/KG (ref 275–300)
PLATELET # BLD AUTO: 369 THOU/MM3 (ref 130–400)
PMV BLD AUTO: 9.3 FL (ref 9.4–12.4)
POTASSIUM SERPL-SCNC: 3.4 MEQ/L (ref 3.5–5.2)
PROT SERPL-MCNC: 6.2 G/DL (ref 6.1–8)
RBC # BLD AUTO: 4.32 MILL/MM3 (ref 4.7–6.1)
RH FACTOR: NORMAL
SEGMENTED NEUTROPHILS ABSOLUTE COUNT: 16.2 THOU/MM3 (ref 1.8–7.7)
SODIUM SERPL-SCNC: 132 MEQ/L (ref 135–145)
WBC # BLD AUTO: 18.2 THOU/MM3 (ref 4.8–10.8)

## 2024-04-30 PROCEDURE — 85610 PROTHROMBIN TIME: CPT

## 2024-04-30 PROCEDURE — 99285 EMERGENCY DEPT VISIT HI MDM: CPT

## 2024-04-30 PROCEDURE — 6370000000 HC RX 637 (ALT 250 FOR IP)

## 2024-04-30 PROCEDURE — 2580000003 HC RX 258: Performed by: PHYSICIAN ASSISTANT

## 2024-04-30 PROCEDURE — 87040 BLOOD CULTURE FOR BACTERIA: CPT

## 2024-04-30 PROCEDURE — 83690 ASSAY OF LIPASE: CPT

## 2024-04-30 PROCEDURE — 2500000003 HC RX 250 WO HCPCS: Performed by: PHYSICIAN ASSISTANT

## 2024-04-30 PROCEDURE — 85025 COMPLETE CBC W/AUTO DIFF WBC: CPT

## 2024-04-30 PROCEDURE — 80053 COMPREHEN METABOLIC PANEL: CPT

## 2024-04-30 PROCEDURE — 1200000003 HC TELEMETRY R&B

## 2024-04-30 PROCEDURE — 86850 RBC ANTIBODY SCREEN: CPT

## 2024-04-30 PROCEDURE — 6360000002 HC RX W HCPCS: Performed by: PHYSICIAN ASSISTANT

## 2024-04-30 PROCEDURE — 96374 THER/PROPH/DIAG INJ IV PUSH: CPT

## 2024-04-30 PROCEDURE — A4216 STERILE WATER/SALINE, 10 ML: HCPCS | Performed by: PHYSICIAN ASSISTANT

## 2024-04-30 PROCEDURE — 6360000004 HC RX CONTRAST MEDICATION: Performed by: PHYSICIAN ASSISTANT

## 2024-04-30 PROCEDURE — 96375 TX/PRO/DX INJ NEW DRUG ADDON: CPT

## 2024-04-30 PROCEDURE — 6360000002 HC RX W HCPCS

## 2024-04-30 PROCEDURE — 93005 ELECTROCARDIOGRAM TRACING: CPT | Performed by: EMERGENCY MEDICINE

## 2024-04-30 PROCEDURE — 74177 CT ABD & PELVIS W/CONTRAST: CPT

## 2024-04-30 PROCEDURE — 2580000003 HC RX 258

## 2024-04-30 PROCEDURE — 99223 1ST HOSP IP/OBS HIGH 75: CPT

## 2024-04-30 PROCEDURE — 71045 X-RAY EXAM CHEST 1 VIEW: CPT

## 2024-04-30 PROCEDURE — 86901 BLOOD TYPING SEROLOGIC RH(D): CPT

## 2024-04-30 PROCEDURE — 83605 ASSAY OF LACTIC ACID: CPT

## 2024-04-30 PROCEDURE — 86900 BLOOD TYPING SEROLOGIC ABO: CPT

## 2024-04-30 PROCEDURE — 36415 COLL VENOUS BLD VENIPUNCTURE: CPT

## 2024-04-30 RX ORDER — KETOROLAC TROMETHAMINE 30 MG/ML
15 INJECTION, SOLUTION INTRAMUSCULAR; INTRAVENOUS EVERY 6 HOURS
Status: DISCONTINUED | OUTPATIENT
Start: 2024-04-30 | End: 2024-04-30

## 2024-04-30 RX ORDER — ACETAMINOPHEN 325 MG/1
650 TABLET ORAL EVERY 6 HOURS PRN
Status: DISCONTINUED | OUTPATIENT
Start: 2024-04-30 | End: 2024-05-03 | Stop reason: HOSPADM

## 2024-04-30 RX ORDER — KETOROLAC TROMETHAMINE 30 MG/ML
15 INJECTION, SOLUTION INTRAMUSCULAR; INTRAVENOUS ONCE
Status: COMPLETED | OUTPATIENT
Start: 2024-04-30 | End: 2024-04-30

## 2024-04-30 RX ORDER — ONDANSETRON 2 MG/ML
4 INJECTION INTRAMUSCULAR; INTRAVENOUS EVERY 6 HOURS PRN
Status: DISCONTINUED | OUTPATIENT
Start: 2024-04-30 | End: 2024-05-03 | Stop reason: HOSPADM

## 2024-04-30 RX ORDER — ONDANSETRON 2 MG/ML
4 INJECTION INTRAMUSCULAR; INTRAVENOUS ONCE
Status: COMPLETED | OUTPATIENT
Start: 2024-04-30 | End: 2024-04-30

## 2024-04-30 RX ORDER — SODIUM CHLORIDE 9 MG/ML
INJECTION, SOLUTION INTRAVENOUS PRN
Status: DISCONTINUED | OUTPATIENT
Start: 2024-04-30 | End: 2024-05-03 | Stop reason: HOSPADM

## 2024-04-30 RX ORDER — PANTOPRAZOLE SODIUM 40 MG/10ML
40 INJECTION, POWDER, LYOPHILIZED, FOR SOLUTION INTRAVENOUS DAILY
Status: DISCONTINUED | OUTPATIENT
Start: 2024-05-01 | End: 2024-05-03 | Stop reason: HOSPADM

## 2024-04-30 RX ORDER — ACETAMINOPHEN 650 MG/1
650 SUPPOSITORY RECTAL EVERY 6 HOURS PRN
Status: DISCONTINUED | OUTPATIENT
Start: 2024-04-30 | End: 2024-05-03 | Stop reason: HOSPADM

## 2024-04-30 RX ORDER — 0.9 % SODIUM CHLORIDE 0.9 %
1000 INTRAVENOUS SOLUTION INTRAVENOUS ONCE
Status: COMPLETED | OUTPATIENT
Start: 2024-04-30 | End: 2024-04-30

## 2024-04-30 RX ORDER — SODIUM CHLORIDE 0.9 % (FLUSH) 0.9 %
10 SYRINGE (ML) INJECTION EVERY 12 HOURS SCHEDULED
Status: DISCONTINUED | OUTPATIENT
Start: 2024-04-30 | End: 2024-05-03 | Stop reason: HOSPADM

## 2024-04-30 RX ORDER — MORPHINE SULFATE 4 MG/ML
4 INJECTION, SOLUTION INTRAMUSCULAR; INTRAVENOUS ONCE
Status: COMPLETED | OUTPATIENT
Start: 2024-04-30 | End: 2024-04-30

## 2024-04-30 RX ORDER — ONDANSETRON 4 MG/1
4 TABLET, ORALLY DISINTEGRATING ORAL EVERY 8 HOURS PRN
Status: DISCONTINUED | OUTPATIENT
Start: 2024-04-30 | End: 2024-05-03 | Stop reason: HOSPADM

## 2024-04-30 RX ORDER — MORPHINE SULFATE 2 MG/ML
2 INJECTION, SOLUTION INTRAMUSCULAR; INTRAVENOUS ONCE
Status: COMPLETED | OUTPATIENT
Start: 2024-04-30 | End: 2024-04-30

## 2024-04-30 RX ORDER — POLYETHYLENE GLYCOL 3350 17 G/17G
17 POWDER, FOR SOLUTION ORAL DAILY PRN
Status: DISCONTINUED | OUTPATIENT
Start: 2024-04-30 | End: 2024-05-03 | Stop reason: HOSPADM

## 2024-04-30 RX ORDER — POTASSIUM CHLORIDE 20 MEQ/1
40 TABLET, EXTENDED RELEASE ORAL PRN
Status: DISCONTINUED | OUTPATIENT
Start: 2024-04-30 | End: 2024-05-03 | Stop reason: HOSPADM

## 2024-04-30 RX ORDER — SODIUM CHLORIDE 0.9 % (FLUSH) 0.9 %
10 SYRINGE (ML) INJECTION PRN
Status: DISCONTINUED | OUTPATIENT
Start: 2024-04-30 | End: 2024-05-03 | Stop reason: HOSPADM

## 2024-04-30 RX ORDER — MAGNESIUM SULFATE IN WATER 40 MG/ML
2000 INJECTION, SOLUTION INTRAVENOUS PRN
Status: DISCONTINUED | OUTPATIENT
Start: 2024-04-30 | End: 2024-05-03 | Stop reason: HOSPADM

## 2024-04-30 RX ORDER — POTASSIUM CHLORIDE 7.45 MG/ML
10 INJECTION INTRAVENOUS PRN
Status: DISCONTINUED | OUTPATIENT
Start: 2024-04-30 | End: 2024-05-03 | Stop reason: HOSPADM

## 2024-04-30 RX ORDER — SODIUM CHLORIDE, SODIUM LACTATE, POTASSIUM CHLORIDE, CALCIUM CHLORIDE 600; 310; 30; 20 MG/100ML; MG/100ML; MG/100ML; MG/100ML
INJECTION, SOLUTION INTRAVENOUS CONTINUOUS
Status: ACTIVE | OUTPATIENT
Start: 2024-04-30 | End: 2024-05-01

## 2024-04-30 RX ADMIN — ONDANSETRON 4 MG: 2 INJECTION INTRAMUSCULAR; INTRAVENOUS at 17:14

## 2024-04-30 RX ADMIN — MORPHINE SULFATE 2 MG: 2 INJECTION, SOLUTION INTRAMUSCULAR; INTRAVENOUS at 18:00

## 2024-04-30 RX ADMIN — FAMOTIDINE 20 MG: 10 INJECTION, SOLUTION INTRAVENOUS at 17:14

## 2024-04-30 RX ADMIN — MORPHINE SULFATE 4 MG: 4 INJECTION, SOLUTION INTRAMUSCULAR; INTRAVENOUS at 19:08

## 2024-04-30 RX ADMIN — IOPAMIDOL 80 ML: 755 INJECTION, SOLUTION INTRAVENOUS at 17:03

## 2024-04-30 RX ADMIN — SODIUM CHLORIDE 1000 ML: 9 INJECTION, SOLUTION INTRAVENOUS at 16:55

## 2024-04-30 RX ADMIN — POTASSIUM CHLORIDE 40 MEQ: 1500 TABLET, EXTENDED RELEASE ORAL at 21:43

## 2024-04-30 RX ADMIN — SODIUM CHLORIDE, PRESERVATIVE FREE 10 ML: 5 INJECTION INTRAVENOUS at 21:30

## 2024-04-30 RX ADMIN — KETOROLAC TROMETHAMINE 15 MG: 30 INJECTION, SOLUTION INTRAMUSCULAR at 21:29

## 2024-04-30 RX ADMIN — SODIUM CHLORIDE, POTASSIUM CHLORIDE, SODIUM LACTATE AND CALCIUM CHLORIDE: 600; 310; 30; 20 INJECTION, SOLUTION INTRAVENOUS at 21:42

## 2024-04-30 ASSESSMENT — PAIN DESCRIPTION - LOCATION: LOCATION: ABDOMEN

## 2024-04-30 ASSESSMENT — PAIN SCALES - GENERAL
PAINLEVEL_OUTOF10: 8
PAINLEVEL_OUTOF10: 6
PAINLEVEL_OUTOF10: 8
PAINLEVEL_OUTOF10: 2
PAINLEVEL_OUTOF10: 7
PAINLEVEL_OUTOF10: 3
PAINLEVEL_OUTOF10: 2
PAINLEVEL_OUTOF10: 9

## 2024-04-30 ASSESSMENT — ENCOUNTER SYMPTOMS
VOMITING: 1
DIARRHEA: 0
NAUSEA: 1
CONSTIPATION: 1
COUGH: 0
RHINORRHEA: 0
ABDOMINAL PAIN: 1
BLOOD IN STOOL: 0
SHORTNESS OF BREATH: 0
SORE THROAT: 0
ANAL BLEEDING: 0
COLOR CHANGE: 0

## 2024-04-30 ASSESSMENT — PAIN - FUNCTIONAL ASSESSMENT
PAIN_FUNCTIONAL_ASSESSMENT: PREVENTS OR INTERFERES SOME ACTIVE ACTIVITIES AND ADLS
PAIN_FUNCTIONAL_ASSESSMENT: 0-10

## 2024-04-30 ASSESSMENT — PAIN DESCRIPTION - FREQUENCY: FREQUENCY: INTERMITTENT

## 2024-04-30 ASSESSMENT — PAIN DESCRIPTION - PAIN TYPE: TYPE: ACUTE PAIN

## 2024-04-30 ASSESSMENT — PAIN DESCRIPTION - DESCRIPTORS: DESCRIPTORS: ACHING

## 2024-04-30 ASSESSMENT — PAIN DESCRIPTION - ORIENTATION: ORIENTATION: MID;LOWER

## 2024-04-30 NOTE — ED NOTES
ED to inpatient nurses report      Chief Complaint:  Chief Complaint   Patient presents with    Abdominal Pain     Present to ED from: home    MOA:     LOC: alert and orientated to name, place, date  Mobility: Independent  Oxygen Baseline: 98%    Current needs required: RA     Code Status:   Prior    What abnormal results were found and what did you give/do to treat them? Abd mass  Any procedures or intervention occur? Fluids, CT, xray, labs    Mental Status:  Level of Consciousness: Alert (0)    Psych Assessment:        Vitals:  Patient Vitals for the past 24 hrs:   BP Temp Temp src Pulse Resp SpO2 Weight   04/30/24 1822 139/78 -- -- 66 18 98 % --   04/30/24 1748 (!) 142/73 -- -- 68 18 98 % --   04/30/24 1652 138/73 -- -- 72 18 97 % 65.3 kg (144 lb)   04/30/24 1520 (!) 157/87 97.8 °F (36.6 °C) Oral 78 16 96 % --        LDAs:   Peripheral IV 04/30/24 Right Forearm (Active)   Site Assessment Clean, dry & intact 04/30/24 1754   Line Status Infusing 04/30/24 1754   Phlebitis Assessment No symptoms 04/30/24 1754   Infiltration Assessment 0 04/30/24 1754       Peripheral IV 04/30/24 Posterior;Right Hand (Active)   Site Assessment Clean, dry & intact 04/30/24 1721   Phlebitis Assessment No symptoms 04/30/24 1721   Infiltration Assessment 0 04/30/24 1721       Ambulatory Status:  No data recorded    Diagnosis:  DISPOSITION Admitted 04/30/2024 06:25:29 PM   Final diagnoses:   None        Consults:  IP CONSULT TO GENERAL SURGERY     Pain Score:  Pain Assessment  Pain Assessment: 0-10  Pain Level: 3    C-SSRS:   Risk of Suicide: No Risk    Sepsis Screening:  Sepsis Risk Score: 1.88    [place] Fall Risk:       Swallow Screening        Preferred Language:   English      ALLERGIES     Patient has no known allergies.    SURGICAL HISTORY       Past Surgical History:   Procedure Laterality Date    UPPER GASTROINTESTINAL ENDOSCOPY N/A 4/22/2024    ESOPHAGOGASTRODUODENOSCOPY BIOPSY performed by Geovanni Lynne MD at [facility] ENDOSCOPY        PAST MEDICAL HISTORY     History reviewed. No pertinent past medical history.        Electronically signed by George Mir RN on 4/30/2024 at 6:40 PM

## 2024-04-30 NOTE — H&P
Hospitalist - History & Physical      Patient: Tian Schmitz    Unit/Bed:16/016A  YOB: 1940  MRN: [id number]   Acct: [id number]   PCP: Antony Garcia MD    Date of Service: Pt seen/examined on 04/30/24  and Admitted to Inpatient with expected LOS greater than two midnights due to medical therapy.     Chief Complaint:  abdominal pain, N/V    Assessment and Plan:  Abdominal pain, concern for malignancy:   Patient recently hospitalized for acute GI bleed, found to have masslike structure noted on EGD.  Presents today with significant intractable abdominal pain, afebrile, hemodynamically stable.  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  Mass has not been biopsied, was planning for colonoscopy later this week outpatient.  General surgery consulted- Dr. St made aware, GI consult placed- Please contact Dr. Leyva tomorrow AM.  Monitor daily CBC, continue as needed antiemetics, analgesics, telemetry, gentle IVF.  IV PPI.    Leukocytosis:   WBC 18.2, patient afebrile, nontoxic-appearing.  Suspect reactive, will continue to monitor with daily CBC.    Hypokalemia:   K3.4.  Potassium replacement ordered, nursing communication placed.  Repeat BMP in the a.m., telemetry.    Mild hyponatremia:   .  Suspect secondary to dehydration, poor p.o. intake.  Start gentle IVF, repeat BMP in the AM.    Normocytic anemia, with recent upper GI bleed:   Hemoglobin 11.7, improved from recent hospitalization.  No signs of bleeding per exam or per history.  Will continue to monitor daily CBC.  Transition from p.o. PPI to IV.        DVT prophylaxis: SCDs    History Of Present Illness:    Tian is an 83-year-old  male with past medical history of upper GI bleed who presents to [facility] ED today for the evaluation of significant

## 2024-04-30 NOTE — ED NOTES
Medicated per mar. Reports pain intermittent. Reports pain cramping and sharp pain. Pt grimacing at times. Will monitor

## 2024-04-30 NOTE — ED TRIAGE NOTES
Presents to ER with wife with increased abd pain. Reports hx of recent carcinoma or stomach. No treatment yet. Pt sees Dr. Leyva. Pt would like to go to [facility] for treatment but unable to due to needing colonoscopy. Wife reports colonoscopy scheduled 05/02 but she reports she does not believe he can make it that far with emesis.Reports recent admission. Wife reports pt received 4 blood transfusions. Wife reports weight loss, nausea, vomiting, constipation. Reports abdominal pain since fall. Will monitor

## 2024-04-30 NOTE — ED NOTES
Pt transported to [facility] on cart in stable condition. Floor contacted and spoke with staff prior to transport.

## 2024-04-30 NOTE — ED PROVIDER NOTES
[facility] EMERGENCY DEPT      Pt Name: Tian Schmitz  MRN: [id number]  Birthdate 1940  Date of evaluation: 4/30/2024  Provider: Carmen Norris PA-C    CHIEF COMPLAINT       Chief Complaint   Patient presents with    Abdominal Pain       Nurses Notes reviewed and I agree except as noted in the HPI.      HISTORY OF PRESENT ILLNESS    Tian Schmitz is a 83 y.o. male who presents from home with wife and daughter for epigastric abdominal pain.  Patient has had intermittent but manageable upper abdominal pain and loss of appetite for months.  The pain worsened and has been constant today prompting him to come in for evaluation.  The patient has had nausea and vomiting-unable to keep down food or fluids today.  There had been constipation however they resolved that with MiraLAX and Ex-Lax.  Patient describes the pain as \"gas pain.\"  Patient has had a 30 pound weight loss.  Patient and family deny fever, chills, chest pain, dyspnea, hematochezia, melena, hematemesis, dizziness, or other complaints.    Patient had endoscopy recently by Dr. Street.  He was told he has ulcers and a biopsy came back positive for carcinoma.  Patient was scheduled for colonoscopy in a couple days but he and his family do not want that.  They want referred to the [facility].    1 week ago patient had gone to [facility] and was found to have a hemoglobin of 5.9.  He received 2 units of blood there, was transferred here, and received 2 more.  They wanted a CT scan of his abdomen at that time however it was not performed.    Location/Symptom: Epigastric abdominal pain  Timing/Onset: Months  Context/Setting: Pain has been present with nausea, vomiting, loss of appetite, and weight loss.  Recent endoscopy showed carcinoma and ulcers.  Quality: \"Gas pain\"  Duration: Persistent today previously intermittent  Modifying Factors: Was improved by walking around not improved by anything today  Severity: Varies up to 9/10    REVIEW OF SYSTEMS  stable had improvement in his nausea and pain.  Vital signs remained stable.    Results were discussed with the patient, spouse, and daughter as well as desire for admission and they were amenable. Dr. Coats of our hospitalists' service was consulted and graciously accepted admission. The patient was admitted to the hospital in fair condition.      CRITICAL CARE:   During my workup of this patient, it did become clear to me the critical nature of this patient's illness which did require my constant attention, and a critical care time 30 minutes was given    CONSULTS:  Dr. Farrukh Coats (hospitalist)    PROCEDURES:  None    FINAL IMPRESSION      1. Epigastric pain    2. Nausea and vomiting, unspecified vomiting type    3. Intra-abdominal neoplasm          DISPOSITION/PLAN     1. Epigastric pain    2. Nausea and vomiting, unspecified vomiting type    3. Intra-abdominal neoplasm      Admission      (Please note that portions of this note were completed with a voice recognition program.  Efforts were made to edit the dictations but occasionally words are mis-transcribed.)    Carmen Norris PA-C 04/30/24 7:15 PM    RINA Pretty Jennifer, PA-C  05/05/24 0600

## 2024-05-01 LAB
ALBUMIN SERPL BCG-MCNC: 2.3 G/DL (ref 3.5–5.1)
ALP SERPL-CCNC: 61 U/L (ref 38–126)
ALT SERPL W/O P-5'-P-CCNC: 7 U/L (ref 11–66)
ANION GAP SERPL CALC-SCNC: 9 MEQ/L (ref 8–16)
AST SERPL-CCNC: 12 U/L (ref 5–40)
BASOPHILS ABSOLUTE: 0.1 THOU/MM3 (ref 0–0.1)
BASOPHILS NFR BLD AUTO: 0.7 %
BILIRUB SERPL-MCNC: 0.5 MG/DL (ref 0.3–1.2)
BUN SERPL-MCNC: 13 MG/DL (ref 7–22)
CALCIUM SERPL-MCNC: 8.1 MG/DL (ref 8.5–10.5)
CEA SERPL-MCNC: 11.2 NG/ML (ref 0–5)
CHLORIDE SERPL-SCNC: 105 MEQ/L (ref 98–111)
CO2 SERPL-SCNC: 23 MEQ/L (ref 23–33)
CREAT SERPL-MCNC: 0.5 MG/DL (ref 0.4–1.2)
DEPRECATED RDW RBC AUTO: 56.8 FL (ref 35–45)
EKG ATRIAL RATE: 75 BPM
EKG P AXIS: 41 DEGREES
EKG P-R INTERVAL: 202 MS
EKG Q-T INTERVAL: 420 MS
EKG QRS DURATION: 140 MS
EKG QTC CALCULATION (BAZETT): 469 MS
EKG R AXIS: -82 DEGREES
EKG T AXIS: 67 DEGREES
EKG VENTRICULAR RATE: 75 BPM
EOSINOPHIL NFR BLD AUTO: 1.3 %
EOSINOPHILS ABSOLUTE: 0.2 THOU/MM3 (ref 0–0.4)
ERYTHROCYTE [DISTWIDTH] IN BLOOD BY AUTOMATED COUNT: 18 % (ref 11.5–14.5)
GFR SERPL CREATININE-BSD FRML MDRD: > 90 ML/MIN/1.73M2
GLUCOSE SERPL-MCNC: 80 MG/DL (ref 70–108)
HCT VFR BLD AUTO: 31.7 % (ref 42–52)
HGB BLD-MCNC: 10.1 GM/DL (ref 14–18)
IMM GRANULOCYTES # BLD AUTO: 0.06 THOU/MM3 (ref 0–0.07)
IMM GRANULOCYTES NFR BLD AUTO: 0.4 %
LYMPHOCYTES ABSOLUTE: 0.7 THOU/MM3 (ref 1–4.8)
LYMPHOCYTES NFR BLD AUTO: 5 %
MCH RBC QN AUTO: 27.5 PG (ref 26–33)
MCHC RBC AUTO-ENTMCNC: 31.9 GM/DL (ref 32.2–35.5)
MCV RBC AUTO: 86.4 FL (ref 80–94)
MONOCYTES ABSOLUTE: 0.9 THOU/MM3 (ref 0.4–1.3)
MONOCYTES NFR BLD AUTO: 6.9 %
NEUTROPHILS ABSOLUTE: 11.7 THOU/MM3 (ref 1.8–7.7)
NEUTROPHILS NFR BLD AUTO: 85.7 %
NRBC BLD AUTO-RTO: 0 /100 WBC
PLATELET # BLD AUTO: 299 THOU/MM3 (ref 130–400)
PMV BLD AUTO: 9.6 FL (ref 9.4–12.4)
POTASSIUM SERPL-SCNC: 3.7 MEQ/L (ref 3.5–5.2)
PROT SERPL-MCNC: 5.2 G/DL (ref 6.1–8)
RBC # BLD AUTO: 3.67 MILL/MM3 (ref 4.7–6.1)
SODIUM SERPL-SCNC: 137 MEQ/L (ref 135–145)
WBC # BLD AUTO: 13.6 THOU/MM3 (ref 4.8–10.8)

## 2024-05-01 PROCEDURE — 82378 CARCINOEMBRYONIC ANTIGEN: CPT

## 2024-05-01 PROCEDURE — 85025 COMPLETE CBC W/AUTO DIFF WBC: CPT

## 2024-05-01 PROCEDURE — C9113 INJ PANTOPRAZOLE SODIUM, VIA: HCPCS

## 2024-05-01 PROCEDURE — 36415 COLL VENOUS BLD VENIPUNCTURE: CPT

## 2024-05-01 PROCEDURE — 99232 SBSQ HOSP IP/OBS MODERATE 35: CPT | Performed by: INTERNAL MEDICINE

## 2024-05-01 PROCEDURE — 6360000002 HC RX W HCPCS

## 2024-05-01 PROCEDURE — 80053 COMPREHEN METABOLIC PANEL: CPT

## 2024-05-01 PROCEDURE — 2580000003 HC RX 258

## 2024-05-01 PROCEDURE — 1200000003 HC TELEMETRY R&B

## 2024-05-01 PROCEDURE — 93010 ELECTROCARDIOGRAM REPORT: CPT | Performed by: INTERNAL MEDICINE

## 2024-05-01 RX ADMIN — HYDROMORPHONE HYDROCHLORIDE 0.25 MG: 1 INJECTION, SOLUTION INTRAMUSCULAR; INTRAVENOUS; SUBCUTANEOUS at 15:30

## 2024-05-01 RX ADMIN — SODIUM CHLORIDE, PRESERVATIVE FREE 10 ML: 5 INJECTION INTRAVENOUS at 08:01

## 2024-05-01 RX ADMIN — HYDROMORPHONE HYDROCHLORIDE 0.25 MG: 1 INJECTION, SOLUTION INTRAMUSCULAR; INTRAVENOUS; SUBCUTANEOUS at 18:47

## 2024-05-01 RX ADMIN — HYDROMORPHONE HYDROCHLORIDE 0.25 MG: 1 INJECTION, SOLUTION INTRAMUSCULAR; INTRAVENOUS; SUBCUTANEOUS at 12:30

## 2024-05-01 RX ADMIN — PANTOPRAZOLE SODIUM 40 MG: 40 INJECTION, POWDER, FOR SOLUTION INTRAVENOUS at 08:01

## 2024-05-01 RX ADMIN — HYDROMORPHONE HYDROCHLORIDE 0.25 MG: 1 INJECTION, SOLUTION INTRAMUSCULAR; INTRAVENOUS; SUBCUTANEOUS at 06:13

## 2024-05-01 RX ADMIN — HYDROMORPHONE HYDROCHLORIDE 0.25 MG: 1 INJECTION, SOLUTION INTRAMUSCULAR; INTRAVENOUS; SUBCUTANEOUS at 09:27

## 2024-05-01 RX ADMIN — HYDROMORPHONE HYDROCHLORIDE 0.25 MG: 1 INJECTION, SOLUTION INTRAMUSCULAR; INTRAVENOUS; SUBCUTANEOUS at 01:50

## 2024-05-01 ASSESSMENT — PAIN SCALES - GENERAL
PAINLEVEL_OUTOF10: 3
PAINLEVEL_OUTOF10: 4
PAINLEVEL_OUTOF10: 5
PAINLEVEL_OUTOF10: 5
PAINLEVEL_OUTOF10: 2
PAINLEVEL_OUTOF10: 4
PAINLEVEL_OUTOF10: 5
PAINLEVEL_OUTOF10: 4
PAINLEVEL_OUTOF10: 4
PAINLEVEL_OUTOF10: 1
PAINLEVEL_OUTOF10: 4
PAINLEVEL_OUTOF10: 5

## 2024-05-01 ASSESSMENT — PAIN DESCRIPTION - LOCATION
LOCATION: ABDOMEN

## 2024-05-01 ASSESSMENT — PAIN DESCRIPTION - DESCRIPTORS
DESCRIPTORS: ACHING

## 2024-05-01 ASSESSMENT — PAIN DESCRIPTION - ORIENTATION
ORIENTATION: LEFT;RIGHT;LOWER
ORIENTATION: LOWER
ORIENTATION: LOWER
ORIENTATION: LEFT;RIGHT;LOWER
ORIENTATION: LOWER
ORIENTATION: LOWER
ORIENTATION: RIGHT;LEFT;LOWER

## 2024-05-01 ASSESSMENT — PAIN - FUNCTIONAL ASSESSMENT
PAIN_FUNCTIONAL_ASSESSMENT: PREVENTS OR INTERFERES SOME ACTIVE ACTIVITIES AND ADLS

## 2024-05-01 ASSESSMENT — PAIN DESCRIPTION - FREQUENCY
FREQUENCY: INTERMITTENT
FREQUENCY: INTERMITTENT

## 2024-05-01 ASSESSMENT — PAIN DESCRIPTION - PAIN TYPE
TYPE: ACUTE PAIN
TYPE: ACUTE PAIN

## 2024-05-01 NOTE — CARE COORDINATION
Case Management Assessment Initial Evaluation    Date/Time of Evaluation: 2024 8:00 AM  Assessment Completed by: Johana Parr RN    If patient is discharged prior to next notation, then this note serves as note for discharge by case management.    Patient Name: Tian Schmitz                   YOB: 1940  Diagnosis: Epigastric pain [R10.13]  Abdominal pain [R10.9]  Intra-abdominal neoplasm [D49.89]  Nausea and vomiting, unspecified vomiting type [R11.2]                   Date / Time: 2024  3:15 PM  Location: [facility]     Patient Admission Status: Inpatient   Readmission Risk Low 0-14, Mod 15-19), High > 20: Readmission Risk Score: 13.1    Current PCP: Antony Garcia MD    Additional Case Management Notes: To ED with abdominal pain and n/v. Had OP colonoscopy planned for Thursday, but came to ED for evaluation for uncontrolled pain. Hospitalist following. GI and GS to see. NPO. Telemetry. IVF@60. IV dilaudid prn. IV protonix. Hgb 10.1 (11.7) Blood cultures pending. Per patient, plan for OR Friday.     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 There is fistulous communication   between the stomach and colon via this mass. There is a partial degree of   obstruction of the colon at the level of the mass.   CXR: Negative    Patient Goals/Plan/Treatment Preferences: Met w/ Don and his wife Amelie. Verified insurance and PCP. He is independent, works 3-4 days per week and actively drives. Plans to return home w/ wife at discharge. Denies needs for DME or HH services.      24 1126   Service Assessment   Patient Orientation Alert and Oriented   Cognition Alert   History Provided By Patient;Spouse   Primary Caregiver Self   Accompanied By/Relationship wife Amelie   Support Systems Spouse/Significant Other;Children;Friends/Neighbors   Patient's Healthcare Decision Maker  is: Patient Declined (Legal Next of Kin Remains as Decision Maker)   PCP Verified by CM Yes   Last Visit to PCP Within last 3 months   Prior Functional Level Independent in ADLs/IADLs   Current Functional Level Independent in ADLs/IADLs   Can patient return to prior living arrangement Yes   Ability to make needs known: Good   Family able to assist with home care needs: Yes   Would you like for me to discuss the discharge plan with any other family members/significant others, and if so, who? Yes  (Wife at bedside)   Financial Resources Medicare;Other (Comment)  (Secondary: [place] [employer] [place])   Community Resources None   CM/SW Referral ADLs/IADLs   Social/Functional History   Lives With Spouse   Type of Home House   Active  Yes   Discharge Planning   Type of Residence House   Living Arrangements Spouse/Significant Other   Current Services Prior To Admission None   Potential Assistance Needed N/A   DME Ordered? No   Potential Assistance Purchasing Medications No   Type of Home Care Services None   Patient expects to be discharged to: House   History of falls? 0   Services At/After Discharge   Confirm Follow Up Transport Family   Condition of Participation: Discharge Planning   The Plan for Transition of Care is related to the following treatment goals: Keep stable and keep pain controlled until surgery Friday

## 2024-05-01 NOTE — CONSULTS
Consult History & Physical      Patient:  Tian Schmitz  YOB: 1940  MRN: [id number]     Acct: [id number]    Chief Complaint:    Chief Complaint   Patient presents with    Abdominal Pain       Date of Service: Pt seen/examined in consultation on 5/1/2024    History Of Present Illness:      83 y.o. male who we are asked to see/evaluate by Lm Lawrence MD for medical management of abdominal pain. Patient came to the ED yesterday for abdominal pain, nausea, and vomiting. The pain is intermittent and located to the mid abdomen. Denies hematemesis, diarrhea, constipation, melena, and hematochezia. Patient was recently hospitalized last week for SOB and dizziness. He was noted to have anemia and melena. EGD 04/22/24 demonstrated small hiatal hernia, 3 cm antral ulcer with pigmented spot, biopsied, normal duodenum. 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 A referral to [facility] for oncology treatment has been started. 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 Of note, patient had a recent +Cologuard test OP.    Past Medical History:    History reviewed. No pertinent past medical history.    Home Medications:  Prior to Admission medications    Medication Sig Start Date End Date Taking? Authorizing Provider   docusate sodium (COLACE, DULCOLAX) 100 MG CAPS Take 100 mg by mouth 2 times daily 4/23/24   Kiley Bustos PA-C   senna (SENOKOT) 8.6 MG tablet Take 2 tablets by mouth nightly 4/23/24 5/23/24  Molly Rosado, APRN - CNP   polyethylene glycol (GLYCOLAX) 17 g packet Take 1 packet by mouth 2 times daily 4/23/24 5/23/24  Molly Rosado, RASHARD - CNP   pantoprazole (PROTONIX) 40 MG tablet Take 1 tablet by  mouth every morning (before breakfast) 4/23/24   Geovanni Lynne MD       Surgical History:  Past Surgical History:   Procedure Laterality Date    UPPER GASTROINTESTINAL ENDOSCOPY N/A 4/22/2024    ESOPHAGOGASTRODUODENOSCOPY BIOPSY performed by Geovanni Lynne MD at [facility] ENDOSCOPY       Family History:  No family history on file.    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    Social History:   TOBACCO:   reports that he has quit smoking. His smoking use included cigarettes. He has never used smokeless tobacco.  ETOH:   reports no history of alcohol use.    12 point Review Of Systems completed & negative except for pertinent positives in HPI        PHYSICAL EXAM:  BP (!) 144/75   Pulse 67   Temp 97.5 °F (36.4 °C) (Oral)   Resp 16   Ht 1.854 m (6' 1\")   Wt 65.4 kg (144 lb 2.9 oz)   SpO2 100%   BMI 19.02 kg/m²     General appearance: No apparent distress, appears stated age and cooperative.  HEENT: Normal cephalic, atraumatic without obvious deformity. Pupils equal, round, and reactive to light.   Neck: Supple, with full range of motion. No jugular venous distention. Trachea midline.  Respiratory:  Normal respiratory effort. Clear to auscultation, bilaterally without Rales/Wheezes/Rhonchi.  Cardiovascular: Regular rate and rhythm without murmurs, rubs or gallops.  Abdomen: Soft, tender to palpation, non-distended with active bowel sounds.  Musculoskeletal: No clubbing, cyanosis or edema bilaterally.  Skin: Pale, warm, dry. No rashes or lesions.  Neurologic: Alert and oriented, thought content appropriate, normal insight    Labs:   Recent Labs     05/01/24  0550   WBC 13.6*   HGB 10.1*   HCT 31.7*        Recent Labs     05/01/24  0550      K 3.7      CO2 23   BUN 13   CREATININE 0.5   CALCIUM 8.1*     Recent Labs     05/01/24  0550

## 2024-05-01 NOTE — PROGRESS NOTES
Chart and notes and CT scan report reviewed.    Agree with Dr St that surgical intervention is indicated in this situation of colonic obstruction with large mass at HF with attachment and fistula into gastric body.  Colonoscopy associated with high risk of complications and would not alleviate the obstructive or fistulous issue.  Risk of perf and leak around fistulous tract with endosopic intervention and air insufflation.    REC:  Surgical intervention with timing as per Dr St.        Thanks!    Pola Conroy MD    Electronically signed by Pola Conroy MD on 5/1/2024 at 12:28 PM

## 2024-05-01 NOTE — CARE COORDINATION
05/01/24 1125   Readmission Assessment   Number of Days since last admission? 1-7 days   Previous Disposition Home with Family   Who is being Interviewed Patient   What was the patient's/caregiver's perception as to why they think they needed to return back to the hospital? Other (Comment)  (uncontrolled pain, nausea/vomiting)   Did you visit your Primary Care Physician after you left the hospital, before you returned this time? No   Why weren't you able to visit your PCP? Other (Comment)  (Appointment today)   Did you see a specialist, such as Cardiac, Pulmonary, Orthopedic Physician, etc. after you left the hospital? No   Who advised the patient to return to the hospital? Self-referral   Does the patient report anything that got in the way of taking their medications? No   In our efforts to provide the best possible care to you and others like you, can you think of anything that we could have done to help you after you left the hospital the first time, so that you might not have needed to return so soon? Other (Comment)  (Repeat CT scan prior to previous discharge)

## 2024-05-01 NOTE — PROGRESS NOTES
Progress Note    Patient:  Tian Schmitz    Unit/Bed:6K-20/020-A  YOB: 1940  MRN: [id number]   Acct: [id number]   Admit date: 4/30/2024      Principal Problem:    Abdominal pain  Resolved Problems:    * No resolved hospital problems. 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 he is not eligible for colonoscopy secondary to suspected colonic obstruction from retained colonic gas he is not eligible for colonoscopy since he will be at high risk for complications that would not alleviate obstruction or fistula and would have a high risk of perforation and leak around the fistulous tract with endoscopic intervention and air insufflation and 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    Subjective: Day 1 of stay with hospital stay    No acute decompensation overnight I have placed GI consult to see whether or not the patient will be eligible for inpatient colonoscopy he will not be eligible for this I appreciate GI assistance General surgery is on board and has spoken to the wife  \"TROPONINT\" in the last 72 hours.  BNP: No results for input(s): \"BNP\" in the last 72 hours.  Lipids: No results for input(s): \"CHOL\", \"TRIG\", \"HDL\", \"LDL\" in the last 72 hours.    Invalid input(s): \"LDLCALC\"  ABGs: No results found for: \"PH\", \"PCO2\", \"PO2\", \"HCO3\", \"O2SAT\"        Radiology reports as per the Radiologist  Radiology: CT ABDOMEN PELVIS W IV CONTRAST Additional Contrast? None    Result Date: 4/30/2024  ADDENDUM #1 This report was discussed with KENDRA HERNANDEZ  on Apr 30, 2024 17:38:00 EDT. This document has been electronically signed by: Sugar Angeles on 04/30/2024 05:38 PM ORIGINAL REPORT CT abdomen and pelvis with contrast Comparison: None Findings: Calcified granuloma within the left lower lobe. No consolidation or pleural effusion. There are multiple peripelvic cysts within the bilateral kidneys. There are tiny calcified granulomas within the liver and spleen. The pancreas and adrenal glands are unremarkable. There are no calcified gallstones. 8.3 x 13.0 x 12.1 cm solid heterogeneous mass extending from the lumen of the hepatic flexure of the colon through the mesentery to involve the body of the stomach. This is associated with fistulous communication between the lumen of the colon and stomach. No extraluminal collection. Mild edema of the adjacent mesentery. The colon is dilated and distended with fluid from the level of the cecum to the level of the mass with diameter measuring up to 9 cm. No small bowel dilatation. There is colonic diverticulosis without diverticulitis. Unremarkable prostate gland and urinary bladder. Nonvisualization of the appendix. No secondary findings to suggest appendicitis. The bones are intact. Mild multifocal aneurysmal dilatation of the aorta measuring up to 2.5 cm.     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 There is fistulous communication between the stomach and colon via this mass. There

## 2024-05-01 NOTE — PROGRESS NOTES
Surg Pt seen consult to follow  pt would likely not wendie prep for colonoscopy or surg  plan OR on fri

## 2024-05-02 LAB
ALBUMIN SERPL BCG-MCNC: 2.2 G/DL (ref 3.5–5.1)
ALP SERPL-CCNC: 61 U/L (ref 38–126)
ALT SERPL W/O P-5'-P-CCNC: 7 U/L (ref 11–66)
ANION GAP SERPL CALC-SCNC: 10 MEQ/L (ref 8–16)
AST SERPL-CCNC: 11 U/L (ref 5–40)
BASOPHILS ABSOLUTE: 0.1 THOU/MM3 (ref 0–0.1)
BASOPHILS NFR BLD AUTO: 0.6 %
BILIRUB SERPL-MCNC: 0.5 MG/DL (ref 0.3–1.2)
BUN SERPL-MCNC: 12 MG/DL (ref 7–22)
CALCIUM SERPL-MCNC: 7.9 MG/DL (ref 8.5–10.5)
CHLORIDE SERPL-SCNC: 104 MEQ/L (ref 98–111)
CO2 SERPL-SCNC: 22 MEQ/L (ref 23–33)
CREAT SERPL-MCNC: 0.5 MG/DL (ref 0.4–1.2)
DEPRECATED RDW RBC AUTO: 57.3 FL (ref 35–45)
EOSINOPHIL NFR BLD AUTO: 1.3 %
EOSINOPHILS ABSOLUTE: 0.2 THOU/MM3 (ref 0–0.4)
ERYTHROCYTE [DISTWIDTH] IN BLOOD BY AUTOMATED COUNT: 18.1 % (ref 11.5–14.5)
GFR SERPL CREATININE-BSD FRML MDRD: > 90 ML/MIN/1.73M2
GLUCOSE SERPL-MCNC: 81 MG/DL (ref 70–108)
HCT VFR BLD AUTO: 32.9 % (ref 42–52)
HGB BLD-MCNC: 10.2 GM/DL (ref 14–18)
IMM GRANULOCYTES # BLD AUTO: 0.05 THOU/MM3 (ref 0–0.07)
IMM GRANULOCYTES NFR BLD AUTO: 0.4 %
LYMPHOCYTES ABSOLUTE: 0.7 THOU/MM3 (ref 1–4.8)
LYMPHOCYTES NFR BLD AUTO: 5.2 %
MAGNESIUM SERPL-MCNC: 2 MG/DL (ref 1.6–2.4)
MCH RBC QN AUTO: 26.9 PG (ref 26–33)
MCHC RBC AUTO-ENTMCNC: 31 GM/DL (ref 32.2–35.5)
MCV RBC AUTO: 86.8 FL (ref 80–94)
MONOCYTES ABSOLUTE: 0.9 THOU/MM3 (ref 0.4–1.3)
MONOCYTES NFR BLD AUTO: 7 %
NEUTROPHILS ABSOLUTE: 11.4 THOU/MM3 (ref 1.8–7.7)
NEUTROPHILS NFR BLD AUTO: 85.5 %
NRBC BLD AUTO-RTO: 0 /100 WBC
PLATELET # BLD AUTO: 292 THOU/MM3 (ref 130–400)
PMV BLD AUTO: 9.7 FL (ref 9.4–12.4)
POTASSIUM SERPL-SCNC: 3.5 MEQ/L (ref 3.5–5.2)
PROT SERPL-MCNC: 5.1 G/DL (ref 6.1–8)
RBC # BLD AUTO: 3.79 MILL/MM3 (ref 4.7–6.1)
SODIUM SERPL-SCNC: 136 MEQ/L (ref 135–145)
WBC # BLD AUTO: 13.3 THOU/MM3 (ref 4.8–10.8)

## 2024-05-02 PROCEDURE — 85025 COMPLETE CBC W/AUTO DIFF WBC: CPT

## 2024-05-02 PROCEDURE — 6360000002 HC RX W HCPCS

## 2024-05-02 PROCEDURE — C9113 INJ PANTOPRAZOLE SODIUM, VIA: HCPCS

## 2024-05-02 PROCEDURE — 80053 COMPREHEN METABOLIC PANEL: CPT

## 2024-05-02 PROCEDURE — 2580000003 HC RX 258

## 2024-05-02 PROCEDURE — 1200000003 HC TELEMETRY R&B

## 2024-05-02 PROCEDURE — 83735 ASSAY OF MAGNESIUM: CPT

## 2024-05-02 PROCEDURE — 6360000002 HC RX W HCPCS: Performed by: INTERNAL MEDICINE

## 2024-05-02 PROCEDURE — 6370000000 HC RX 637 (ALT 250 FOR IP)

## 2024-05-02 PROCEDURE — 36415 COLL VENOUS BLD VENIPUNCTURE: CPT

## 2024-05-02 PROCEDURE — 99232 SBSQ HOSP IP/OBS MODERATE 35: CPT | Performed by: INTERNAL MEDICINE

## 2024-05-02 RX ORDER — POLYETHYLENE GLYCOL 3350 17 G/17G
17 POWDER, FOR SOLUTION ORAL DAILY PRN
Status: CANCELLED | OUTPATIENT
Start: 2024-05-02

## 2024-05-02 RX ADMIN — HYDROMORPHONE HYDROCHLORIDE 0.25 MG: 1 INJECTION, SOLUTION INTRAMUSCULAR; INTRAVENOUS; SUBCUTANEOUS at 11:35

## 2024-05-02 RX ADMIN — HYDROMORPHONE HYDROCHLORIDE 0.5 MG: 1 INJECTION, SOLUTION INTRAMUSCULAR; INTRAVENOUS; SUBCUTANEOUS at 15:34

## 2024-05-02 RX ADMIN — HYDROMORPHONE HYDROCHLORIDE 0.25 MG: 1 INJECTION, SOLUTION INTRAMUSCULAR; INTRAVENOUS; SUBCUTANEOUS at 06:05

## 2024-05-02 RX ADMIN — ONDANSETRON 4 MG: 2 INJECTION INTRAMUSCULAR; INTRAVENOUS at 15:38

## 2024-05-02 RX ADMIN — HYDROMORPHONE HYDROCHLORIDE 0.25 MG: 1 INJECTION, SOLUTION INTRAMUSCULAR; INTRAVENOUS; SUBCUTANEOUS at 01:38

## 2024-05-02 RX ADMIN — PANTOPRAZOLE SODIUM 40 MG: 40 INJECTION, POWDER, FOR SOLUTION INTRAVENOUS at 08:10

## 2024-05-02 RX ADMIN — SODIUM CHLORIDE, PRESERVATIVE FREE 10 ML: 5 INJECTION INTRAVENOUS at 20:12

## 2024-05-02 RX ADMIN — POTASSIUM CHLORIDE 40 MEQ: 1500 TABLET, EXTENDED RELEASE ORAL at 08:10

## 2024-05-02 RX ADMIN — HYDROMORPHONE HYDROCHLORIDE 0.5 MG: 1 INJECTION, SOLUTION INTRAMUSCULAR; INTRAVENOUS; SUBCUTANEOUS at 21:40

## 2024-05-02 RX ADMIN — SODIUM CHLORIDE, PRESERVATIVE FREE 10 ML: 5 INJECTION INTRAVENOUS at 08:13

## 2024-05-02 ASSESSMENT — PAIN DESCRIPTION - LOCATION
LOCATION: ABDOMEN

## 2024-05-02 ASSESSMENT — PAIN - FUNCTIONAL ASSESSMENT
PAIN_FUNCTIONAL_ASSESSMENT: ACTIVITIES ARE NOT PREVENTED

## 2024-05-02 ASSESSMENT — PAIN DESCRIPTION - ORIENTATION
ORIENTATION: LEFT
ORIENTATION: RIGHT;LEFT
ORIENTATION: RIGHT;LEFT

## 2024-05-02 ASSESSMENT — PAIN SCALES - GENERAL
PAINLEVEL_OUTOF10: 6
PAINLEVEL_OUTOF10: 6
PAINLEVEL_OUTOF10: 7
PAINLEVEL_OUTOF10: 5

## 2024-05-02 ASSESSMENT — PAIN DESCRIPTION - DESCRIPTORS
DESCRIPTORS: ACHING
DESCRIPTORS: DISCOMFORT;CRUSHING
DESCRIPTORS: ACHING;DISCOMFORT

## 2024-05-02 NOTE — PROGRESS NOTES
[facility]   Dr. 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    OBJECTIVE  CURRENT VITALS BP (!) 153/84   Pulse 70   Temp 97.9 °F (36.6 °C) (Oral)   Resp 16   Ht 1.854 m (6' 1\")   Wt 68.4 kg (150 lb 12.7 oz)   SpO2 100%   BMI 19.89 kg/m²   LUNGS: Lungs clear   ABDOMEN: mild LUQ pain  WOUNDS: n/a  24 HR INTAKE/OUTPUT :   Intake/Output Summary (Last 24 hours) at 5/2/2024 1919  Last data filed at 5/2/2024 1205  Gross per 24 hour   Intake 600 ml   Output --   Net 600 ml     DRAIN/TUBE OUTPUT :      LABS  CBC :   Lab Results   Component Value Date/Time    WBC 13.3 05/02/2024 06:00 AM    HGB 10.2 05/02/2024 06:00 AM    HCT 32.9 05/02/2024 06:00 AM     05/02/2024 06:00 AM     BMP:   Lab Results   Component Value Date/Time     05/02/2024 06:00 AM    K 3.5 05/02/2024 06:00 AM     05/02/2024 06:00 AM    CO2 22 05/02/2024 06:00 AM    BUN 12 05/02/2024 06:00 AM    CREATININE 0.5 05/02/2024 06:00 AM    MG 2.0 05/02/2024 06:00 AM       ASSESSMENT  1. 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 As above      Kedar St MD  Electronically signed 5/2/2024 at 7:19 PM

## 2024-05-02 NOTE — PROGRESS NOTES
Labs     05/02/24  0600   ALKPHOS 61   ALT 7*   AST 11   BILITOT 0.5       Amylase and Lipase:No results for input(s): \"LACTA\", \"AMYLASE\" in the last 72 hours.  Lactic Acid: No results for input(s): \"LACTA\" in the last 72 hours.  Troponin: No results for input(s): \"CKTOTAL\", \"CKMB\", \"TROPONINT\" in the last 72 hours.  BNP: No results for input(s): \"BNP\" in the last 72 hours.  Lipids: No results for input(s): \"CHOL\", \"TRIG\", \"HDL\", \"LDL\" in the last 72 hours.    Invalid input(s): \"LDLCALC\"  ABGs: No results found for: \"PH\", \"PCO2\", \"PO2\", \"HCO3\", \"O2SAT\"        Radiology reports as per the Radiologist  Radiology: CT ABDOMEN PELVIS W IV CONTRAST Additional Contrast? None    Result Date: 4/30/2024  ADDENDUM #1 This report was discussed with KENDRA HERNANDEZ  on Apr 30, 2024 17:38:00 EDT. This document has been electronically signed by: Sugar Angeles on 04/30/2024 05:38 PM ORIGINAL REPORT CT abdomen and pelvis with contrast Comparison: None Findings: Calcified granuloma within the left lower lobe. No consolidation or pleural effusion. There are multiple peripelvic cysts within the bilateral kidneys. There are tiny calcified granulomas within the liver and spleen. The pancreas and adrenal glands are unremarkable. There are no calcified gallstones. 8.3 x 13.0 x 12.1 cm solid heterogeneous mass extending from the lumen of the hepatic flexure of the colon through the mesentery to involve the body of the stomach. This is associated with fistulous communication between the lumen of the colon and stomach. No extraluminal collection. Mild edema of the adjacent mesentery. The colon is dilated and distended with fluid from the level of the cecum to the level of the mass with diameter measuring up to 9 cm. No small bowel dilatation. There is colonic diverticulosis without diverticulitis. Unremarkable prostate gland and urinary bladder. Nonvisualization of the appendix. No secondary findings to suggest appendicitis. The bones

## 2024-05-02 NOTE — CARE COORDINATION
5/2/24, 9:52 AM EDT    DISCHARGE ON GOING EVALUATION    Tian CORRALES [facility] day: 2  Location: 6K-20/020-A Reason for admit: Epigastric pain [R10.13]  Abdominal pain [R10.9]  Intra-abdominal neoplasm [D49.89]  Nausea and vomiting, unspecified vomiting type [R11.2]     Procedures: n/a    Imaging since last note: 5/2 CT abd: 13 cm neoplasm involving the hepatic flexure of the colon, the adjacent mesentery and the body of the stomach. There is fistulous communication   between the stomach and colon via this mass. There is a partial degree of obstruction of the colon at the level of the mass.    Barriers to Discharge: Transfer initiated to [facility]. IV protonix, pain control.     PCP: Antony Garcia MD  Readmission Risk Score: 12.3    Patient Goals/Plan/Treatment Preferences: From home with wife.      *Accepted at [facility]. Awaiting bed availability. Electronically signed by Manolo Chan RN on 5/2/2024 at 2:45 PM

## 2024-05-02 NOTE — CONSULTS
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    HISTORY OF PRESENT ILLNESS:  The patient is an interesting 83-year-old male who has had about a 30 pounds weight loss over the last several months, had noted a change in stool and had a Cologuard test performed per the family physician that apparently was positive.  Patient's wife states that he had a Cologuard 3 years ago that was negative.  His last colonoscopy was about 20 years ago.  He may have a grandparent who had colon cancer.  Nonetheless, he actually had not been admitted to the hospital ever until last week when he was admitted with what was thought to be upper GI bleed, underwent an EGD per Dr. Lynne, where there was a small apparently gastric body ulcer, and he was transfused with 4 units of blood as he had presented with a hemoglobin of 6.  Nonetheless, he was discharged with plans for an outpatient colonoscopy, but he came in with increasing abdominal pain, cramping, and a CT scan was performed showing a 13 cm hepatic flexure mass with evidence of fistulization to the stomach.  Surgical consultation has been requested.    PAST MEDICAL HISTORY:  Negative for any known illnesses.  He denies diabetes, any cardiac disease, hypertension, hypercholesterolemia.  He did have a recent anemia.    PAST SURGICAL HISTORY:  None other than the upper GI.    Again, past history is negative.    HOME MEDICATIONS:  Included Colace, Glycolax, Protonix.    ALLERGIES:  NONE.      SOCIAL HISTORY:  He smoked remotely for a few years but has  but plan at this point in time, surgical resection on Friday.          LIZETT CORTEZ MD      D:  05/01/2024 19:25:44     T:  05/01/2024 23:03:51     GREG/SCHUYLER  Job #:  [id number]     Doc#:  [id number]

## 2024-05-03 VITALS
SYSTOLIC BLOOD PRESSURE: 156 MMHG | HEART RATE: 72 BPM | RESPIRATION RATE: 16 BRPM | TEMPERATURE: 98.1 F | WEIGHT: 150.79 LBS | DIASTOLIC BLOOD PRESSURE: 78 MMHG | HEIGHT: 73 IN | BODY MASS INDEX: 19.99 KG/M2 | OXYGEN SATURATION: 96 %

## 2024-05-03 LAB
ALBUMIN SERPL BCG-MCNC: 2.3 G/DL (ref 3.5–5.1)
ALP SERPL-CCNC: 61 U/L (ref 38–126)
ALT SERPL W/O P-5'-P-CCNC: 6 U/L (ref 11–66)
ANION GAP SERPL CALC-SCNC: 13 MEQ/L (ref 8–16)
AST SERPL-CCNC: 13 U/L (ref 5–40)
BASOPHILS ABSOLUTE: 0.1 THOU/MM3 (ref 0–0.1)
BASOPHILS NFR BLD AUTO: 0.6 %
BILIRUB SERPL-MCNC: 0.5 MG/DL (ref 0.3–1.2)
BUN SERPL-MCNC: 14 MG/DL (ref 7–22)
CALCIUM SERPL-MCNC: 8.1 MG/DL (ref 8.5–10.5)
CHLORIDE SERPL-SCNC: 103 MEQ/L (ref 98–111)
CO2 SERPL-SCNC: 20 MEQ/L (ref 23–33)
CREAT SERPL-MCNC: 0.5 MG/DL (ref 0.4–1.2)
DEPRECATED RDW RBC AUTO: 56.8 FL (ref 35–45)
EOSINOPHIL NFR BLD AUTO: 1.1 %
EOSINOPHILS ABSOLUTE: 0.2 THOU/MM3 (ref 0–0.4)
ERYTHROCYTE [DISTWIDTH] IN BLOOD BY AUTOMATED COUNT: 17.9 % (ref 11.5–14.5)
GFR SERPL CREATININE-BSD FRML MDRD: > 90 ML/MIN/1.73M2
GLUCOSE SERPL-MCNC: 76 MG/DL (ref 70–108)
HCT VFR BLD AUTO: 33.6 % (ref 42–52)
HGB BLD-MCNC: 10.6 GM/DL (ref 14–18)
IMM GRANULOCYTES # BLD AUTO: 0.06 THOU/MM3 (ref 0–0.07)
IMM GRANULOCYTES NFR BLD AUTO: 0.4 %
LYMPHOCYTES ABSOLUTE: 0.8 THOU/MM3 (ref 1–4.8)
LYMPHOCYTES NFR BLD AUTO: 5.9 %
MCH RBC QN AUTO: 27.2 PG (ref 26–33)
MCHC RBC AUTO-ENTMCNC: 31.5 GM/DL (ref 32.2–35.5)
MCV RBC AUTO: 86.4 FL (ref 80–94)
MONOCYTES ABSOLUTE: 0.9 THOU/MM3 (ref 0.4–1.3)
MONOCYTES NFR BLD AUTO: 6.6 %
NEUTROPHILS ABSOLUTE: 12 THOU/MM3 (ref 1.8–7.7)
NEUTROPHILS NFR BLD AUTO: 85.4 %
NRBC BLD AUTO-RTO: 0 /100 WBC
PLATELET # BLD AUTO: 310 THOU/MM3 (ref 130–400)
PMV BLD AUTO: 10.4 FL (ref 9.4–12.4)
POTASSIUM SERPL-SCNC: 3.7 MEQ/L (ref 3.5–5.2)
PROT SERPL-MCNC: 5.2 G/DL (ref 6.1–8)
RBC # BLD AUTO: 3.89 MILL/MM3 (ref 4.7–6.1)
SODIUM SERPL-SCNC: 136 MEQ/L (ref 135–145)
WBC # BLD AUTO: 14 THOU/MM3 (ref 4.8–10.8)

## 2024-05-03 PROCEDURE — 2580000003 HC RX 258

## 2024-05-03 PROCEDURE — 80053 COMPREHEN METABOLIC PANEL: CPT

## 2024-05-03 PROCEDURE — 36415 COLL VENOUS BLD VENIPUNCTURE: CPT

## 2024-05-03 PROCEDURE — 99239 HOSP IP/OBS DSCHRG MGMT >30: CPT | Performed by: INTERNAL MEDICINE

## 2024-05-03 PROCEDURE — C9113 INJ PANTOPRAZOLE SODIUM, VIA: HCPCS

## 2024-05-03 PROCEDURE — 6360000002 HC RX W HCPCS

## 2024-05-03 PROCEDURE — 85025 COMPLETE CBC W/AUTO DIFF WBC: CPT

## 2024-05-03 PROCEDURE — 6360000002 HC RX W HCPCS: Performed by: INTERNAL MEDICINE

## 2024-05-03 RX ADMIN — HYDROMORPHONE HYDROCHLORIDE 1 MG: 1 INJECTION, SOLUTION INTRAMUSCULAR; INTRAVENOUS; SUBCUTANEOUS at 11:31

## 2024-05-03 RX ADMIN — SODIUM CHLORIDE, PRESERVATIVE FREE 10 ML: 5 INJECTION INTRAVENOUS at 08:16

## 2024-05-03 RX ADMIN — PANTOPRAZOLE SODIUM 40 MG: 40 INJECTION, POWDER, FOR SOLUTION INTRAVENOUS at 08:16

## 2024-05-03 RX ADMIN — HYDROMORPHONE HYDROCHLORIDE 0.5 MG: 1 INJECTION, SOLUTION INTRAMUSCULAR; INTRAVENOUS; SUBCUTANEOUS at 06:26

## 2024-05-03 ASSESSMENT — PAIN - FUNCTIONAL ASSESSMENT
PAIN_FUNCTIONAL_ASSESSMENT: ACTIVITIES ARE NOT PREVENTED

## 2024-05-03 ASSESSMENT — PAIN DESCRIPTION - ORIENTATION
ORIENTATION: LEFT
ORIENTATION: RIGHT;LEFT
ORIENTATION: RIGHT;LEFT

## 2024-05-03 ASSESSMENT — PAIN SCALES - GENERAL
PAINLEVEL_OUTOF10: 6
PAINLEVEL_OUTOF10: 0
PAINLEVEL_OUTOF10: 4
PAINLEVEL_OUTOF10: 4

## 2024-05-03 ASSESSMENT — PAIN DESCRIPTION - LOCATION
LOCATION: ABDOMEN

## 2024-05-03 ASSESSMENT — PAIN DESCRIPTION - DESCRIPTORS
DESCRIPTORS: ACHING
DESCRIPTORS: DISCOMFORT
DESCRIPTORS: DISCOMFORT

## 2024-05-03 NOTE — CARE COORDINATION
5/3/24, 2:08 PM EDT    Transferred to [facility].   Patient goals/plan/ treatment preferences discussed by  and .  Patient goals/plan/ treatment preferences reviewed with patient/ family.  Patient/ family verbalize understanding of discharge plan and are in agreement with goal/plan/treatment preferences.  Understanding was demonstrated using the teach back method.  AVS provided by RN at time of discharge, which includes all necessary medical information pertaining to the patients current course of illness, treatment, post-discharge goals of care, and treatment preferences.     Services At/After Discharge: Acute Hospital

## 2024-05-03 NOTE — PROGRESS NOTES
Report called to nurse at the [facility].  Patient being transported by [facility], wife and son following.  Paient discharged with belongings and in stable condition.

## 2024-05-05 LAB
BACTERIA BLD AEROBE CULT: NORMAL
BACTERIA BLD AEROBE CULT: NORMAL

## 2024-05-07 NOTE — DISCHARGE SUMMARY
- 108 mg/dL    Creatinine 0.5 0.4 - 1.2 mg/dL    BUN 12 7 - 22 mg/dL    Sodium 136 135 - 145 meq/L    Potassium reflex Magnesium 3.5 3.5 - 5.2 meq/L    Chloride 104 98 - 111 meq/L    CO2 22 (L) 23 - 33 meq/L    Calcium 7.9 (L) 8.5 - 10.5 mg/dL    AST 11 5 - 40 U/L    Alkaline Phosphatase 61 38 - 126 U/L    Total Protein 5.1 (L) 6.1 - 8.0 g/dL    Albumin 2.2 (L) 3.5 - 5.1 g/dL    Total Bilirubin 0.5 0.3 - 1.2 mg/dL    ALT 7 (L) 11 - 66 U/L   CBC with Auto Differential   Result Value Ref Range    WBC 13.3 (H) 4.8 - 10.8 thou/mm3    RBC 3.79 (L) 4.70 - 6.10 mill/mm3    Hemoglobin 10.2 (L) 14.0 - 18.0 gm/dl    Hematocrit 32.9 (L) 42.0 - 52.0 %    MCV 86.8 80.0 - 94.0 fL    MCH 26.9 26.0 - 33.0 pg    MCHC 31.0 (L) 32.2 - 35.5 gm/dl    RDW-CV 18.1 (H) 11.5 - 14.5 %    RDW-SD 57.3 (H) 35.0 - 45.0 fL    Platelets 292 130 - 400 thou/mm3    MPV 9.7 9.4 - 12.4 fL    Seg Neutrophils 85.5 %    Lymphocytes 5.2 %    Monocytes % 7.0 %    Eosinophils 1.3 %    Basophils 0.6 %    Immature Granulocytes % 0.4 %    Neutrophils Absolute 11.4 (H) 1.8 - 7.7 thou/mm3    Lymphocytes Absolute 0.7 (L) 1.0 - 4.8 thou/mm3    Monocytes Absolute 0.9 0.4 - 1.3 thou/mm3    Eosinophils Absolute 0.2 0.0 - 0.4 thou/mm3    Basophils Absolute 0.1 0.0 - 0.1 thou/mm3    Immature Grans (Abs) 0.05 0.00 - 0.07 thou/mm3    nRBC 0 /100 wbc   Anion Gap   Result Value Ref Range    Anion Gap 10.0 8.0 - 16.0 meq/L   Glomerular Filtration Rate, Estimated   Result Value Ref Range    Est, Glom Filt Rate > 90 >60 ml/min/1.73m2   Magnesium   Result Value Ref Range    Magnesium 2.0 1.6 - 2.4 mg/dL   Comprehensive Metabolic Panel w/ Reflex to MG   Result Value Ref Range    Glucose 76 70 - 108 mg/dL    Creatinine 0.5 0.4 - 1.2 mg/dL    BUN 14 7 - 22 mg/dL    Sodium 136 135 - 145 meq/L    Potassium reflex Magnesium 3.7 3.5 - 5.2 meq/L    Chloride 103 98 - 111 meq/L    CO2 20 (L) 23 - 33 meq/L    Calcium 8.1 (L) 8.5 - 10.5 mg/dL    AST 13 5 - 40 U/L    Alkaline Phosphatase 61

## 2024-08-29 ENCOUNTER — APPOINTMENT (OUTPATIENT)
Age: 84
DRG: 242 | End: 2024-08-29
Attending: INTERNAL MEDICINE
Payer: MEDICARE

## 2024-08-29 ENCOUNTER — HOSPITAL ENCOUNTER (INPATIENT)
Age: 84
LOS: 6 days | Discharge: HOME | DRG: 242 | End: 2024-09-04
Attending: INTERNAL MEDICINE | Admitting: INTERNAL MEDICINE
Payer: MEDICARE

## 2024-08-29 ENCOUNTER — APPOINTMENT (OUTPATIENT)
Dept: GENERAL RADIOLOGY | Age: 84
DRG: 242 | End: 2024-08-29
Attending: INTERNAL MEDICINE
Payer: MEDICARE

## 2024-08-29 DIAGNOSIS — R00.1 BRADYCARDIA: Primary | ICD-10-CM

## 2024-08-29 PROBLEM — E43 SEVERE MALNUTRITION (HCC): Status: ACTIVE | Noted: 2024-08-29

## 2024-08-29 LAB
ALBUMIN SERPL BCG-MCNC: 2.9 G/DL (ref 3.5–5.1)
ALP SERPL-CCNC: 155 U/L (ref 38–126)
ALT SERPL W/O P-5'-P-CCNC: 215 U/L (ref 11–66)
ANION GAP SERPL CALC-SCNC: 16 MEQ/L (ref 8–16)
ANION GAP SERPL CALC-SCNC: 9 MEQ/L (ref 8–16)
APTT PPP: 71 SECONDS (ref 22–38)
AST SERPL-CCNC: 213 U/L (ref 5–40)
BILIRUB CONJ SERPL-MCNC: 0.5 MG/DL (ref 0.1–13.8)
BILIRUB SERPL-MCNC: 1 MG/DL (ref 0.3–1.2)
BUN SERPL-MCNC: 19 MG/DL (ref 7–22)
BUN SERPL-MCNC: 26 MG/DL (ref 7–22)
CA-I BLD ISE-SCNC: 1.07 MMOL/L (ref 1.12–1.32)
CALCIUM SERPL-MCNC: 7.3 MG/DL (ref 8.5–10.5)
CALCIUM SERPL-MCNC: 8.2 MG/DL (ref 8.5–10.5)
CHLORIDE SERPL-SCNC: 103 MEQ/L (ref 98–111)
CHLORIDE SERPL-SCNC: 108 MEQ/L (ref 98–111)
CO2 SERPL-SCNC: 10 MEQ/L (ref 23–33)
CO2 SERPL-SCNC: 21 MEQ/L (ref 23–33)
CREAT SERPL-MCNC: 0.6 MG/DL (ref 0.4–1.2)
CREAT SERPL-MCNC: 0.9 MG/DL (ref 0.4–1.2)
DEPRECATED RDW RBC AUTO: 54.6 FL (ref 35–45)
ECHO AV CUSP MM: 1.8 CM
ECHO EST RA PRESSURE: 5 MMHG
ECHO LV EJECTION FRACTION BIPLANE: 45 % (ref 55–100)
ECHO LV FRACTIONAL SHORTENING: 24 % (ref 28–44)
ECHO LV INTERNAL DIMENSION DIASTOLIC: 4.1 CM (ref 4.2–5.9)
ECHO LV INTERNAL DIMENSION SYSTOLIC: 3.1 CM
ECHO LV IVSD: 1.3 CM (ref 0.6–1)
ECHO LV MASS 2D: 182.5 G (ref 88–224)
ECHO LV POSTERIOR WALL DIASTOLIC: 1.2 CM (ref 0.6–1)
ECHO LV RELATIVE WALL THICKNESS RATIO: 0.59
ECHO PV MAX VELOCITY: 0.7 M/S
ECHO PV PEAK GRADIENT: 2 MMHG
ECHO RV INTERNAL DIMENSION: 3.7 CM
ERYTHROCYTE [DISTWIDTH] IN BLOOD BY AUTOMATED COUNT: 17.2 % (ref 11.5–14.5)
GFR SERPL CREATININE-BSD FRML MDRD: 85 ML/MIN/1.73M2
GFR SERPL CREATININE-BSD FRML MDRD: > 90 ML/MIN/1.73M2
GLUCOSE BLD STRIP.AUTO-MCNC: 129 MG/DL (ref 70–108)
GLUCOSE SERPL-MCNC: 274 MG/DL (ref 70–108)
GLUCOSE SERPL-MCNC: 89 MG/DL (ref 70–108)
HCT VFR BLD AUTO: 33.8 % (ref 42–52)
HGB BLD-MCNC: 10.4 GM/DL (ref 14–18)
INR PPP: 1.13 (ref 0.85–1.13)
LACTATE SERPL-SCNC: 1.7 MMOL/L (ref 0.5–2)
LACTATE SERPL-SCNC: 2.3 MMOL/L (ref 0.5–2)
LACTATE SERPL-SCNC: 6.7 MMOL/L (ref 0.5–2)
MAGNESIUM SERPL-MCNC: 1.8 MG/DL (ref 1.6–2.4)
MAGNESIUM SERPL-MCNC: 1.8 MG/DL (ref 1.6–2.4)
MCH RBC QN AUTO: 26.7 PG (ref 26–33)
MCHC RBC AUTO-ENTMCNC: 30.8 GM/DL (ref 32.2–35.5)
MCV RBC AUTO: 86.9 FL (ref 80–94)
MRSA DNA SPEC QL NAA+PROBE: NEGATIVE
PHOSPHATE SERPL-MCNC: 4 MG/DL (ref 2.4–4.7)
PHOSPHATE SERPL-MCNC: 5.5 MG/DL (ref 2.4–4.7)
PLATELET # BLD AUTO: 203 THOU/MM3 (ref 130–400)
PMV BLD AUTO: 10.8 FL (ref 9.4–12.4)
POTASSIUM SERPL-SCNC: 4.5 MEQ/L (ref 3.5–5.2)
POTASSIUM SERPL-SCNC: 5.6 MEQ/L (ref 3.5–5.2)
PROT SERPL-MCNC: 4.9 G/DL (ref 6.1–8)
RBC # BLD AUTO: 3.89 MILL/MM3 (ref 4.7–6.1)
SODIUM SERPL-SCNC: 129 MEQ/L (ref 135–145)
SODIUM SERPL-SCNC: 138 MEQ/L (ref 135–145)
TSH SERPL DL<=0.005 MIU/L-ACNC: 3.02 UIU/ML (ref 0.4–4.2)
WBC # BLD AUTO: 17 THOU/MM3 (ref 4.8–10.8)

## 2024-08-29 PROCEDURE — 33210 INSERT ELECTRD/PM CATH SNGL: CPT | Performed by: INTERNAL MEDICINE

## 2024-08-29 PROCEDURE — 2500000003 HC RX 250 WO HCPCS: Performed by: INTERNAL MEDICINE

## 2024-08-29 PROCEDURE — 3E033XZ INTRODUCTION OF VASOPRESSOR INTO PERIPHERAL VEIN, PERCUTANEOUS APPROACH: ICD-10-PCS | Performed by: INTERNAL MEDICINE

## 2024-08-29 PROCEDURE — 87205 SMEAR GRAM STAIN: CPT

## 2024-08-29 PROCEDURE — 36415 COLL VENOUS BLD VENIPUNCTURE: CPT

## 2024-08-29 PROCEDURE — 94761 N-INVAS EAR/PLS OXIMETRY MLT: CPT

## 2024-08-29 PROCEDURE — 2500000003 HC RX 250 WO HCPCS

## 2024-08-29 PROCEDURE — 82948 REAGENT STRIP/BLOOD GLUCOSE: CPT

## 2024-08-29 PROCEDURE — 2580000003 HC RX 258

## 2024-08-29 PROCEDURE — 36556 INSERT NON-TUNNEL CV CATH: CPT | Performed by: INTERNAL MEDICINE

## 2024-08-29 PROCEDURE — 85027 COMPLETE CBC AUTOMATED: CPT

## 2024-08-29 PROCEDURE — 5A1935Z RESPIRATORY VENTILATION, LESS THAN 24 CONSECUTIVE HOURS: ICD-10-PCS | Performed by: INTERNAL MEDICINE

## 2024-08-29 PROCEDURE — 84100 ASSAY OF PHOSPHORUS: CPT

## 2024-08-29 PROCEDURE — 31720 CLEARANCE OF AIRWAYS: CPT

## 2024-08-29 PROCEDURE — 2000000000 HC ICU R&B

## 2024-08-29 PROCEDURE — 2580000003 HC RX 258: Performed by: INTERNAL MEDICINE

## 2024-08-29 PROCEDURE — 83605 ASSAY OF LACTIC ACID: CPT

## 2024-08-29 PROCEDURE — 02HV33Z INSERTION OF INFUSION DEVICE INTO SUPERIOR VENA CAVA, PERCUTANEOUS APPROACH: ICD-10-PCS | Performed by: INTERNAL MEDICINE

## 2024-08-29 PROCEDURE — 85730 THROMBOPLASTIN TIME PARTIAL: CPT

## 2024-08-29 PROCEDURE — 82330 ASSAY OF CALCIUM: CPT

## 2024-08-29 PROCEDURE — 6370000000 HC RX 637 (ALT 250 FOR IP)

## 2024-08-29 PROCEDURE — 92953 TEMPORARY EXTERNAL PACING: CPT

## 2024-08-29 PROCEDURE — 99291 CRITICAL CARE FIRST HOUR: CPT | Performed by: INTERNAL MEDICINE

## 2024-08-29 PROCEDURE — 84443 ASSAY THYROID STIM HORMONE: CPT

## 2024-08-29 PROCEDURE — 94002 VENT MGMT INPAT INIT DAY: CPT

## 2024-08-29 PROCEDURE — 85610 PROTHROMBIN TIME: CPT

## 2024-08-29 PROCEDURE — 87641 MR-STAPH DNA AMP PROBE: CPT

## 2024-08-29 PROCEDURE — 87070 CULTURE OTHR SPECIMN AEROBIC: CPT

## 2024-08-29 PROCEDURE — 71045 X-RAY EXAM CHEST 1 VIEW: CPT

## 2024-08-29 PROCEDURE — 83735 ASSAY OF MAGNESIUM: CPT

## 2024-08-29 PROCEDURE — 2700000000 HC OXYGEN THERAPY PER DAY

## 2024-08-29 PROCEDURE — 93306 TTE W/DOPPLER COMPLETE: CPT

## 2024-08-29 PROCEDURE — 93306 TTE W/DOPPLER COMPLETE: CPT | Performed by: INTERNAL MEDICINE

## 2024-08-29 PROCEDURE — 82248 BILIRUBIN DIRECT: CPT

## 2024-08-29 PROCEDURE — 51798 US URINE CAPACITY MEASURE: CPT

## 2024-08-29 PROCEDURE — 80053 COMPREHEN METABOLIC PANEL: CPT

## 2024-08-29 PROCEDURE — 89220 SPUTUM SPECIMEN COLLECTION: CPT

## 2024-08-29 PROCEDURE — 5A1223Z PERFORMANCE OF CARDIAC PACING, CONTINUOUS: ICD-10-PCS | Performed by: INTERNAL MEDICINE

## 2024-08-29 PROCEDURE — 87086 URINE CULTURE/COLONY COUNT: CPT

## 2024-08-29 RX ORDER — SODIUM CHLORIDE 9 MG/ML
INJECTION, SOLUTION INTRAVENOUS PRN
Status: DISCONTINUED | OUTPATIENT
Start: 2024-08-29 | End: 2024-09-04 | Stop reason: HOSPADM

## 2024-08-29 RX ORDER — ACETAMINOPHEN 325 MG/1
650 TABLET ORAL EVERY 6 HOURS PRN
Status: DISCONTINUED | OUTPATIENT
Start: 2024-08-29 | End: 2024-09-04 | Stop reason: HOSPADM

## 2024-08-29 RX ORDER — SODIUM CHLORIDE 0.9 % (FLUSH) 0.9 %
5-40 SYRINGE (ML) INJECTION PRN
Status: DISCONTINUED | OUTPATIENT
Start: 2024-08-29 | End: 2024-09-04 | Stop reason: HOSPADM

## 2024-08-29 RX ORDER — SODIUM CHLORIDE 0.9 % (FLUSH) 0.9 %
5-40 SYRINGE (ML) INJECTION EVERY 12 HOURS SCHEDULED
Status: DISCONTINUED | OUTPATIENT
Start: 2024-08-29 | End: 2024-09-04 | Stop reason: HOSPADM

## 2024-08-29 RX ORDER — NOREPINEPHRINE BITARTRATE 0.06 MG/ML
1-100 INJECTION, SOLUTION INTRAVENOUS CONTINUOUS
Status: DISCONTINUED | OUTPATIENT
Start: 2024-08-29 | End: 2024-09-03

## 2024-08-29 RX ORDER — SODIUM CHLORIDE 9 MG/ML
INJECTION, SOLUTION INTRAVENOUS CONTINUOUS
Status: DISCONTINUED | OUTPATIENT
Start: 2024-08-29 | End: 2024-08-29

## 2024-08-29 RX ORDER — DOXYCYCLINE HYCLATE 100 MG
100 TABLET ORAL EVERY 12 HOURS SCHEDULED
Status: DISCONTINUED | OUTPATIENT
Start: 2024-08-29 | End: 2024-09-04 | Stop reason: HOSPADM

## 2024-08-29 RX ORDER — ONDANSETRON 2 MG/ML
4 INJECTION INTRAMUSCULAR; INTRAVENOUS EVERY 6 HOURS PRN
Status: DISCONTINUED | OUTPATIENT
Start: 2024-08-29 | End: 2024-09-04 | Stop reason: HOSPADM

## 2024-08-29 RX ORDER — POLYETHYLENE GLYCOL 3350 17 G/17G
17 POWDER, FOR SOLUTION ORAL DAILY PRN
Status: DISCONTINUED | OUTPATIENT
Start: 2024-08-29 | End: 2024-09-04 | Stop reason: HOSPADM

## 2024-08-29 RX ORDER — SODIUM CHLORIDE 9 MG/ML
INJECTION, SOLUTION INTRAVENOUS CONTINUOUS
Status: DISCONTINUED | OUTPATIENT
Start: 2024-08-29 | End: 2024-08-31

## 2024-08-29 RX ORDER — POTASSIUM CHLORIDE 7.45 MG/ML
10 INJECTION INTRAVENOUS PRN
Status: DISCONTINUED | OUTPATIENT
Start: 2024-08-29 | End: 2024-09-04 | Stop reason: HOSPADM

## 2024-08-29 RX ORDER — ACETAMINOPHEN 650 MG/1
650 SUPPOSITORY RECTAL EVERY 6 HOURS PRN
Status: DISCONTINUED | OUTPATIENT
Start: 2024-08-29 | End: 2024-09-04 | Stop reason: HOSPADM

## 2024-08-29 RX ORDER — MAGNESIUM SULFATE IN WATER 40 MG/ML
2000 INJECTION, SOLUTION INTRAVENOUS PRN
Status: DISCONTINUED | OUTPATIENT
Start: 2024-08-29 | End: 2024-09-04 | Stop reason: HOSPADM

## 2024-08-29 RX ORDER — POTASSIUM CHLORIDE 29.8 MG/ML
20 INJECTION INTRAVENOUS PRN
Status: DISCONTINUED | OUTPATIENT
Start: 2024-08-29 | End: 2024-09-04 | Stop reason: HOSPADM

## 2024-08-29 RX ORDER — MIDAZOLAM HYDROCHLORIDE 1 MG/ML
1-10 INJECTION, SOLUTION INTRAVENOUS CONTINUOUS
Status: DISCONTINUED | OUTPATIENT
Start: 2024-08-29 | End: 2024-08-29

## 2024-08-29 RX ORDER — ONDANSETRON 4 MG/1
4 TABLET, ORALLY DISINTEGRATING ORAL EVERY 8 HOURS PRN
Status: DISCONTINUED | OUTPATIENT
Start: 2024-08-29 | End: 2024-09-04 | Stop reason: HOSPADM

## 2024-08-29 RX ORDER — FENTANYL CITRATE-0.9 % NACL/PF 10 MCG/ML
25-200 PLASTIC BAG, INJECTION (ML) INTRAVENOUS CONTINUOUS
Status: DISCONTINUED | OUTPATIENT
Start: 2024-08-29 | End: 2024-08-29

## 2024-08-29 RX ADMIN — DOXYCYCLINE HYCLATE 100 MG: 100 TABLET, COATED ORAL at 20:26

## 2024-08-29 RX ADMIN — SODIUM CHLORIDE: 9 INJECTION, SOLUTION INTRAVENOUS at 08:51

## 2024-08-29 RX ADMIN — SODIUM CHLORIDE, PRESERVATIVE FREE 10 ML: 5 INJECTION INTRAVENOUS at 20:26

## 2024-08-29 RX ADMIN — SODIUM BICARBONATE: 84 INJECTION, SOLUTION INTRAVENOUS at 10:42

## 2024-08-29 RX ADMIN — Medication 2 MCG/MIN: at 20:05

## 2024-08-29 RX ADMIN — Medication 25 MCG/HR: at 08:30

## 2024-08-29 RX ADMIN — SODIUM CHLORIDE, PRESERVATIVE FREE 10 ML: 5 INJECTION INTRAVENOUS at 13:01

## 2024-08-29 RX ADMIN — SODIUM CHLORIDE: 9 INJECTION, SOLUTION INTRAVENOUS at 18:20

## 2024-08-29 ASSESSMENT — PULMONARY FUNCTION TESTS
PIF_VALUE: 12
PIF_VALUE: 12
PIF_VALUE: 10

## 2024-08-29 ASSESSMENT — PAIN SCALES - GENERAL: PAINLEVEL_OUTOF10: 0

## 2024-08-29 NOTE — PROCEDURES
PROCEDURE NOTE  Date: 8/29/2024   Name: Tian Schmitz  YOB: 1940    Procedures        Central Line Placement: Medical necessity.  Patient was placed in the attention position.  Patient was placed in Trendelenburg position.  Patient was prepped using Chlorhexidene prep.  Patient was draped utilizing sterile gloves, hair net, mask, sterile gown and sterile OR towels.  Maximum barrier precautions were utilized.  Utilizing the left subclavian approach, patient received 5 cc of 1% lidocaine.  Utilizing an introducer needle, it was placed subcutaneously advanced under the clavicle and leveled flat.  It was subsequently advanced toward the thoracic inlet, until venous return was obtained.  A guide wire was placed through the introducer needle.  The introducer needle was subsequently withdrawn leaving the guide wire in place.  Utilizing a scalpel, a small incision was made in the skin.  A punch dilator was placed over the guide wire and subsequently withdrawn leaving the guide wire in place.  A cordis catheter was subsequently placed over the guide wire.  The guide wire was subsequently withdrawn leaving this catheter in place.  All ports had good venous return and were subsequently flushed with saline.  The catheter was secured using 2.0 silk.  Secondary cleansing with chlorhexidine prep was subsequently placed. Tegaderm with bio- patch dressing was utilized for secondary securing and protection.  There were no complications.    EBL:   Less than 5 mL      Indication:  complete heart block    Electronically signed by Philipp Bryan MD

## 2024-08-29 NOTE — PROCEDURES
PROCEDURE NOTE  Date: 8/29/2024   Name: Tian Schmtiz  YOB: 1940    Procedures      TRANSVENOUS PACER:  CONSENT:  Medical necessity.  INDICATION:  complete heart block.  COMPLICATIONS:  none.  EBL:  none.    PROCEDURE: After Cordis was placed, protective sheath was placed onto the Cordis.  Maintaining sterile technique, pacer was checked with balloon and was functional.  Pacer was hooked up to the pacing battery and control panel.  Pacer was placed through the Cordis.  Balloon was inflated.  Pacer was advanced until ventricular capture was obtained.  Pacer was secured in place at 45 cm.  Balloon was deflated.  Electronically signed by Philipp Bryan MD on 8/29/24 at 10:15 AM EDT

## 2024-08-30 LAB
ALBUMIN SERPL BCG-MCNC: 2.9 G/DL (ref 3.5–5.1)
ALP SERPL-CCNC: 129 U/L (ref 38–126)
ALT SERPL W/O P-5'-P-CCNC: 159 U/L (ref 11–66)
ANION GAP SERPL CALC-SCNC: 9 MEQ/L (ref 8–16)
AST SERPL-CCNC: 90 U/L (ref 5–40)
BILIRUB CONJ SERPL-MCNC: 0.3 MG/DL (ref 0.1–13.8)
BILIRUB SERPL-MCNC: 1.1 MG/DL (ref 0.3–1.2)
BUN SERPL-MCNC: 15 MG/DL (ref 7–22)
CALCIUM SERPL-MCNC: 7.8 MG/DL (ref 8.5–10.5)
CHLORIDE SERPL-SCNC: 109 MEQ/L (ref 98–111)
CO2 SERPL-SCNC: 21 MEQ/L (ref 23–33)
CREAT SERPL-MCNC: 0.6 MG/DL (ref 0.4–1.2)
DEPRECATED RDW RBC AUTO: 50.8 FL (ref 35–45)
ERYTHROCYTE [DISTWIDTH] IN BLOOD BY AUTOMATED COUNT: 17.1 % (ref 11.5–14.5)
GFR SERPL CREATININE-BSD FRML MDRD: > 90 ML/MIN/1.73M2
GLUCOSE SERPL-MCNC: 108 MG/DL (ref 70–108)
HCT VFR BLD AUTO: 31.6 % (ref 42–52)
HGB BLD-MCNC: 10.3 GM/DL (ref 14–18)
MCH RBC QN AUTO: 26.8 PG (ref 26–33)
MCHC RBC AUTO-ENTMCNC: 32.6 GM/DL (ref 32.2–35.5)
MCV RBC AUTO: 82.3 FL (ref 80–94)
PLATELET # BLD AUTO: 193 THOU/MM3 (ref 130–400)
PMV BLD AUTO: 10.9 FL (ref 9.4–12.4)
POTASSIUM SERPL-SCNC: 4.2 MEQ/L (ref 3.5–5.2)
PROT SERPL-MCNC: 5.2 G/DL (ref 6.1–8)
RBC # BLD AUTO: 3.84 MILL/MM3 (ref 4.7–6.1)
SODIUM SERPL-SCNC: 139 MEQ/L (ref 135–145)
WBC # BLD AUTO: 12.7 THOU/MM3 (ref 4.8–10.8)

## 2024-08-30 PROCEDURE — 2580000003 HC RX 258

## 2024-08-30 PROCEDURE — 6370000000 HC RX 637 (ALT 250 FOR IP)

## 2024-08-30 PROCEDURE — 85027 COMPLETE CBC AUTOMATED: CPT

## 2024-08-30 PROCEDURE — 99223 1ST HOSP IP/OBS HIGH 75: CPT | Performed by: INTERNAL MEDICINE

## 2024-08-30 PROCEDURE — 36415 COLL VENOUS BLD VENIPUNCTURE: CPT

## 2024-08-30 PROCEDURE — 6360000002 HC RX W HCPCS

## 2024-08-30 PROCEDURE — 2000000000 HC ICU R&B

## 2024-08-30 PROCEDURE — 99232 SBSQ HOSP IP/OBS MODERATE 35: CPT | Performed by: INTERNAL MEDICINE

## 2024-08-30 PROCEDURE — 80053 COMPREHEN METABOLIC PANEL: CPT

## 2024-08-30 PROCEDURE — 82248 BILIRUBIN DIRECT: CPT

## 2024-08-30 PROCEDURE — 86618 LYME DISEASE ANTIBODY: CPT

## 2024-08-30 RX ORDER — SIMETHICONE 80 MG
80 TABLET,CHEWABLE ORAL EVERY 6 HOURS PRN
Status: DISCONTINUED | OUTPATIENT
Start: 2024-08-30 | End: 2024-09-04 | Stop reason: HOSPADM

## 2024-08-30 RX ORDER — VITAMIN B COMPLEX
1000 TABLET ORAL DAILY
Status: DISCONTINUED | OUTPATIENT
Start: 2024-08-30 | End: 2024-09-04 | Stop reason: HOSPADM

## 2024-08-30 RX ORDER — CETIRIZINE HYDROCHLORIDE 5 MG/1
5 TABLET ORAL DAILY
Status: DISCONTINUED | OUTPATIENT
Start: 2024-08-30 | End: 2024-09-04 | Stop reason: HOSPADM

## 2024-08-30 RX ORDER — CALCIUM POLYCARBOPHIL 625 MG 625 MG/1
625 TABLET ORAL 2 TIMES DAILY
Status: DISCONTINUED | OUTPATIENT
Start: 2024-08-30 | End: 2024-09-04 | Stop reason: HOSPADM

## 2024-08-30 RX ORDER — ENOXAPARIN SODIUM 100 MG/ML
40 INJECTION SUBCUTANEOUS DAILY
Status: DISCONTINUED | OUTPATIENT
Start: 2024-08-30 | End: 2024-09-04 | Stop reason: HOSPADM

## 2024-08-30 RX ORDER — PANTOPRAZOLE SODIUM 40 MG/1
40 TABLET, DELAYED RELEASE ORAL
Status: DISCONTINUED | OUTPATIENT
Start: 2024-08-31 | End: 2024-09-04 | Stop reason: HOSPADM

## 2024-08-30 RX ADMIN — DOXYCYCLINE HYCLATE 100 MG: 100 TABLET, COATED ORAL at 09:03

## 2024-08-30 RX ADMIN — SODIUM CHLORIDE: 9 INJECTION, SOLUTION INTRAVENOUS at 02:15

## 2024-08-30 RX ADMIN — CETIRIZINE HYDROCHLORIDE 5 MG: 5 TABLET ORAL at 12:37

## 2024-08-30 RX ADMIN — CALCIUM POLYCARBOPHIL 625 MG: 625 TABLET, FILM COATED ORAL at 22:07

## 2024-08-30 RX ADMIN — Medication 1000 UNITS: at 12:37

## 2024-08-30 RX ADMIN — ENOXAPARIN SODIUM 40 MG: 100 INJECTION SUBCUTANEOUS at 12:37

## 2024-08-30 RX ADMIN — ACETAMINOPHEN 650 MG: 325 TABLET ORAL at 12:37

## 2024-08-30 RX ADMIN — SODIUM CHLORIDE: 9 INJECTION, SOLUTION INTRAVENOUS at 10:49

## 2024-08-30 RX ADMIN — CALCIUM POLYCARBOPHIL 625 MG: 625 TABLET, FILM COATED ORAL at 12:37

## 2024-08-30 RX ADMIN — SODIUM CHLORIDE, PRESERVATIVE FREE 10 ML: 5 INJECTION INTRAVENOUS at 20:14

## 2024-08-30 RX ADMIN — DOXYCYCLINE HYCLATE 100 MG: 100 TABLET, COATED ORAL at 20:14

## 2024-08-30 RX ADMIN — SODIUM CHLORIDE: 9 INJECTION, SOLUTION INTRAVENOUS at 20:15

## 2024-08-30 ASSESSMENT — PAIN SCALES - GENERAL: PAINLEVEL_OUTOF10: 0

## 2024-08-31 LAB
ALBUMIN SERPL BCG-MCNC: 2.6 G/DL (ref 3.5–5.1)
ALP SERPL-CCNC: 112 U/L (ref 38–126)
ALT SERPL W/O P-5'-P-CCNC: 103 U/L (ref 11–66)
ANION GAP SERPL CALC-SCNC: 8 MEQ/L (ref 8–16)
AST SERPL-CCNC: 41 U/L (ref 5–40)
BACTERIA UR CULT: NORMAL
BILIRUB CONJ SERPL-MCNC: 0.3 MG/DL (ref 0.1–13.8)
BILIRUB SERPL-MCNC: 1.5 MG/DL (ref 0.3–1.2)
BUN SERPL-MCNC: 11 MG/DL (ref 7–22)
CA-I BLD ISE-SCNC: 1.13 MMOL/L (ref 1.12–1.32)
CALCIUM SERPL-MCNC: 7.7 MG/DL (ref 8.5–10.5)
CHLORIDE SERPL-SCNC: 110 MEQ/L (ref 98–111)
CO2 SERPL-SCNC: 19 MEQ/L (ref 23–33)
CREAT SERPL-MCNC: 0.4 MG/DL (ref 0.4–1.2)
DEPRECATED RDW RBC AUTO: 53.7 FL (ref 35–45)
ERYTHROCYTE [DISTWIDTH] IN BLOOD BY AUTOMATED COUNT: 17.3 % (ref 11.5–14.5)
GFR SERPL CREATININE-BSD FRML MDRD: > 90 ML/MIN/1.73M2
GLUCOSE SERPL-MCNC: 86 MG/DL (ref 70–108)
HCT VFR BLD AUTO: 31.9 % (ref 42–52)
HGB BLD-MCNC: 10.4 GM/DL (ref 14–18)
MAGNESIUM SERPL-MCNC: 1.7 MG/DL (ref 1.6–2.4)
MCH RBC QN AUTO: 27.6 PG (ref 26–33)
MCHC RBC AUTO-ENTMCNC: 32.6 GM/DL (ref 32.2–35.5)
MCV RBC AUTO: 84.6 FL (ref 80–94)
PLATELET # BLD AUTO: 85 THOU/MM3 (ref 130–400)
PMV BLD AUTO: 11.4 FL (ref 9.4–12.4)
POTASSIUM SERPL-SCNC: 4.1 MEQ/L (ref 3.5–5.2)
PROT SERPL-MCNC: 5.2 G/DL (ref 6.1–8)
RBC # BLD AUTO: 3.77 MILL/MM3 (ref 4.7–6.1)
SCAN OF BLOOD SMEAR: NORMAL
SODIUM SERPL-SCNC: 137 MEQ/L (ref 135–145)
WBC # BLD AUTO: 8.4 THOU/MM3 (ref 4.8–10.8)

## 2024-08-31 PROCEDURE — 6370000000 HC RX 637 (ALT 250 FOR IP)

## 2024-08-31 PROCEDURE — 36415 COLL VENOUS BLD VENIPUNCTURE: CPT

## 2024-08-31 PROCEDURE — 6360000002 HC RX W HCPCS

## 2024-08-31 PROCEDURE — 85027 COMPLETE CBC AUTOMATED: CPT

## 2024-08-31 PROCEDURE — 82248 BILIRUBIN DIRECT: CPT

## 2024-08-31 PROCEDURE — 99232 SBSQ HOSP IP/OBS MODERATE 35: CPT | Performed by: PHYSICIAN ASSISTANT

## 2024-08-31 PROCEDURE — 2000000000 HC ICU R&B

## 2024-08-31 PROCEDURE — 87205 SMEAR GRAM STAIN: CPT

## 2024-08-31 PROCEDURE — 89220 SPUTUM SPECIMEN COLLECTION: CPT

## 2024-08-31 PROCEDURE — 80053 COMPREHEN METABOLIC PANEL: CPT

## 2024-08-31 PROCEDURE — 83735 ASSAY OF MAGNESIUM: CPT

## 2024-08-31 PROCEDURE — 82330 ASSAY OF CALCIUM: CPT

## 2024-08-31 RX ADMIN — CALCIUM POLYCARBOPHIL 625 MG: 625 TABLET, FILM COATED ORAL at 22:18

## 2024-08-31 RX ADMIN — SIMETHICONE 80 MG: 80 TABLET, CHEWABLE ORAL at 20:16

## 2024-08-31 RX ADMIN — ENOXAPARIN SODIUM 40 MG: 100 INJECTION SUBCUTANEOUS at 09:33

## 2024-08-31 RX ADMIN — PANTOPRAZOLE SODIUM 40 MG: 40 TABLET, DELAYED RELEASE ORAL at 09:33

## 2024-08-31 RX ADMIN — DOXYCYCLINE HYCLATE 100 MG: 100 TABLET, COATED ORAL at 20:16

## 2024-08-31 RX ADMIN — CALCIUM POLYCARBOPHIL 625 MG: 625 TABLET, FILM COATED ORAL at 09:33

## 2024-08-31 RX ADMIN — DOXYCYCLINE HYCLATE 100 MG: 100 TABLET, COATED ORAL at 09:33

## 2024-08-31 RX ADMIN — SIMETHICONE 80 MG: 80 TABLET, CHEWABLE ORAL at 00:42

## 2024-08-31 RX ADMIN — Medication 1000 UNITS: at 09:33

## 2024-08-31 RX ADMIN — ACETAMINOPHEN 650 MG: 325 TABLET ORAL at 16:58

## 2024-08-31 RX ADMIN — CETIRIZINE HYDROCHLORIDE 5 MG: 5 TABLET ORAL at 09:33

## 2024-08-31 ASSESSMENT — PAIN SCALES - GENERAL
PAINLEVEL_OUTOF10: 0
PAINLEVEL_OUTOF10: 0

## 2024-09-01 ENCOUNTER — APPOINTMENT (OUTPATIENT)
Dept: ULTRASOUND IMAGING | Age: 84
DRG: 242 | End: 2024-09-01
Attending: INTERNAL MEDICINE
Payer: MEDICARE

## 2024-09-01 LAB
ALBUMIN SERPL BCG-MCNC: 2.9 G/DL (ref 3.5–5.1)
ALP SERPL-CCNC: 110 U/L (ref 38–126)
ALT SERPL W/O P-5'-P-CCNC: 76 U/L (ref 11–66)
ANION GAP SERPL CALC-SCNC: 4 MEQ/L (ref 8–16)
ANION GAP SERPL CALC-SCNC: 9 MEQ/L (ref 8–16)
AST SERPL-CCNC: 21 U/L (ref 5–40)
BACTERIA SPEC RESP CULT: NORMAL
BILIRUB CONJ SERPL-MCNC: 0.4 MG/DL (ref 0.1–13.8)
BILIRUB SERPL-MCNC: 1.4 MG/DL (ref 0.3–1.2)
BUN SERPL-MCNC: 14 MG/DL (ref 7–22)
BUN SERPL-MCNC: 19 MG/DL (ref 7–22)
CA-I BLD ISE-SCNC: 1.13 MMOL/L (ref 1.12–1.32)
CA-I BLD ISE-SCNC: 1.19 MMOL/L (ref 1.12–1.32)
CALCIUM SERPL-MCNC: 7.8 MG/DL (ref 8.5–10.5)
CALCIUM SERPL-MCNC: 8.1 MG/DL (ref 8.5–10.5)
CHLORIDE SERPL-SCNC: 110 MEQ/L (ref 98–111)
CHLORIDE SERPL-SCNC: 111 MEQ/L (ref 98–111)
CO2 SERPL-SCNC: 18 MEQ/L (ref 23–33)
CO2 SERPL-SCNC: 24 MEQ/L (ref 23–33)
CREAT SERPL-MCNC: 0.6 MG/DL (ref 0.4–1.2)
CREAT SERPL-MCNC: 0.7 MG/DL (ref 0.4–1.2)
DEPRECATED RDW RBC AUTO: 53.3 FL (ref 35–45)
ERYTHROCYTE [DISTWIDTH] IN BLOOD BY AUTOMATED COUNT: 17.4 % (ref 11.5–14.5)
GFR SERPL CREATININE-BSD FRML MDRD: > 90 ML/MIN/1.73M2
GFR SERPL CREATININE-BSD FRML MDRD: > 90 ML/MIN/1.73M2
GLUCOSE SERPL-MCNC: 117 MG/DL (ref 70–108)
GLUCOSE SERPL-MCNC: 123 MG/DL (ref 70–108)
GRAM STN SPEC: NORMAL
HCT VFR BLD AUTO: 35.4 % (ref 42–52)
HGB BLD-MCNC: 11.3 GM/DL (ref 14–18)
INR PPP: 1.1 (ref 0.85–1.13)
MAGNESIUM SERPL-MCNC: 1.9 MG/DL (ref 1.6–2.4)
MAGNESIUM SERPL-MCNC: 2.2 MG/DL (ref 1.6–2.4)
MCH RBC QN AUTO: 26.8 PG (ref 26–33)
MCHC RBC AUTO-ENTMCNC: 31.9 GM/DL (ref 32.2–35.5)
MCV RBC AUTO: 84.1 FL (ref 80–94)
PHOSPHATE SERPL-MCNC: 3.2 MG/DL (ref 2.4–4.7)
PLATELET # BLD AUTO: 160 THOU/MM3 (ref 130–400)
PMV BLD AUTO: 10.9 FL (ref 9.4–12.4)
POTASSIUM SERPL-SCNC: 3.9 MEQ/L (ref 3.5–5.2)
POTASSIUM SERPL-SCNC: 4.7 MEQ/L (ref 3.5–5.2)
PROT SERPL-MCNC: 5.6 G/DL (ref 6.1–8)
RBC # BLD AUTO: 4.21 MILL/MM3 (ref 4.7–6.1)
REASON FOR REJECTION: NORMAL
REASON FOR REJECTION: NORMAL
REJECTED TEST: NORMAL
REJECTED TEST: NORMAL
SODIUM SERPL-SCNC: 137 MEQ/L (ref 135–145)
SODIUM SERPL-SCNC: 139 MEQ/L (ref 135–145)
WBC # BLD AUTO: 9.9 THOU/MM3 (ref 4.8–10.8)

## 2024-09-01 PROCEDURE — 6370000000 HC RX 637 (ALT 250 FOR IP)

## 2024-09-01 PROCEDURE — 99232 SBSQ HOSP IP/OBS MODERATE 35: CPT | Performed by: PHYSICIAN ASSISTANT

## 2024-09-01 PROCEDURE — 6360000002 HC RX W HCPCS

## 2024-09-01 PROCEDURE — 84100 ASSAY OF PHOSPHORUS: CPT

## 2024-09-01 PROCEDURE — 2000000000 HC ICU R&B

## 2024-09-01 PROCEDURE — 36415 COLL VENOUS BLD VENIPUNCTURE: CPT

## 2024-09-01 PROCEDURE — 80053 COMPREHEN METABOLIC PANEL: CPT

## 2024-09-01 PROCEDURE — 85027 COMPLETE CBC AUTOMATED: CPT

## 2024-09-01 PROCEDURE — 82330 ASSAY OF CALCIUM: CPT

## 2024-09-01 PROCEDURE — 85610 PROTHROMBIN TIME: CPT

## 2024-09-01 PROCEDURE — 82248 BILIRUBIN DIRECT: CPT

## 2024-09-01 PROCEDURE — 83735 ASSAY OF MAGNESIUM: CPT

## 2024-09-01 PROCEDURE — 76770 US EXAM ABDO BACK WALL COMP: CPT

## 2024-09-01 PROCEDURE — 2580000003 HC RX 258

## 2024-09-01 RX ORDER — MAGNESIUM SULFATE 1 G/100ML
1000 INJECTION INTRAVENOUS ONCE
Status: COMPLETED | OUTPATIENT
Start: 2024-09-01 | End: 2024-09-01

## 2024-09-01 RX ADMIN — SIMETHICONE 80 MG: 80 TABLET, CHEWABLE ORAL at 14:36

## 2024-09-01 RX ADMIN — CALCIUM POLYCARBOPHIL 625 MG: 625 TABLET, FILM COATED ORAL at 09:05

## 2024-09-01 RX ADMIN — MAGNESIUM SULFATE HEPTAHYDRATE 1000 MG: 1 INJECTION, SOLUTION INTRAVENOUS at 22:15

## 2024-09-01 RX ADMIN — CALCIUM POLYCARBOPHIL 625 MG: 625 TABLET, FILM COATED ORAL at 22:10

## 2024-09-01 RX ADMIN — SODIUM CHLORIDE, PRESERVATIVE FREE 10 ML: 5 INJECTION INTRAVENOUS at 09:06

## 2024-09-01 RX ADMIN — ACETAMINOPHEN 650 MG: 325 TABLET ORAL at 14:36

## 2024-09-01 RX ADMIN — DOXYCYCLINE HYCLATE 100 MG: 100 TABLET, COATED ORAL at 09:05

## 2024-09-01 RX ADMIN — SODIUM CHLORIDE, PRESERVATIVE FREE 10 ML: 5 INJECTION INTRAVENOUS at 19:56

## 2024-09-01 RX ADMIN — SIMETHICONE 80 MG: 80 TABLET, CHEWABLE ORAL at 09:05

## 2024-09-01 RX ADMIN — DOXYCYCLINE HYCLATE 100 MG: 100 TABLET, COATED ORAL at 19:55

## 2024-09-01 RX ADMIN — ENOXAPARIN SODIUM 40 MG: 100 INJECTION SUBCUTANEOUS at 09:06

## 2024-09-01 RX ADMIN — Medication 1000 UNITS: at 09:05

## 2024-09-01 RX ADMIN — PANTOPRAZOLE SODIUM 40 MG: 40 TABLET, DELAYED RELEASE ORAL at 06:05

## 2024-09-01 RX ADMIN — SIMETHICONE 80 MG: 80 TABLET, CHEWABLE ORAL at 22:10

## 2024-09-01 RX ADMIN — POTASSIUM BICARBONATE 20 MEQ: 782 TABLET, EFFERVESCENT ORAL at 22:09

## 2024-09-01 RX ADMIN — CETIRIZINE HYDROCHLORIDE 5 MG: 5 TABLET ORAL at 09:05

## 2024-09-01 ASSESSMENT — PAIN SCALES - GENERAL
PAINLEVEL_OUTOF10: 0
PAINLEVEL_OUTOF10: 0

## 2024-09-01 NOTE — CONSULTS
[facility] ICU 4D  [address] [address]  Dept: [phone]  Loc: [phone]  Visit Date: 8/29/2024    Urology Consult Note    Reason for Consult:  Hydronephrosis and urinary retention   Requesting Physician:  Dr. Angelo Tam    History Obtained From:  patient, electronic medical record    Chief Complaint: Symptomatic Bradycartdia    HISTORY OF PRESENT ILLNESS:      Mr. Schmitz is an 84-year-old male that presented to [facility] with symptomatic bradycardia as a transfer from [facility]. Urology consulted for management of urinary retention and findings of hydronephrosis on renal US. It is reported that the patient had 1 L of urinary output upon placement of the harrell catheter. Patient, though, denies having any hx of urinary difficulties. Reports having a good urinary stream.    His hospital course is also complicated by systolic heart failure, elevated troponin, elevated LFTs, and leukocytosis.     Medical hx of splenic flexure adenocarcinoma s/p colectomy, ileostomy, cataracts, and GERD    Past Medical History:    No past medical history on file.  Past Surgical History:        Procedure Laterality Date    UPPER GASTROINTESTINAL ENDOSCOPY N/A 4/22/2024    ESOPHAGOGASTRODUODENOSCOPY BIOPSY performed by Geovanni Lynne MD at [facility] ENDOSCOPY     Allergies:  Patient has no known allergies.  Social History:  Social History     Socioeconomic History    Marital status:      Spouse name: Not on file    Number of children: Not on file    Years of education: Not on file    Highest education level: Not on file   Occupational History    Not on file   Tobacco Use    Smoking status: Former     Types: Cigarettes    Smokeless tobacco: Never   Substance and Sexual Activity    Alcohol use: Never    Drug use: Never    Sexual activity: Not on file   Other Topics Concern    Not on file   Social History Narrative    Not on file     Social Determinants of Health 
[facility]   Heart Center (EP)  [address] [address]  Dept: [phone]  Cardiac Electrophysiology: Consultation Note  Patient's demographics:  Date:   8/30/2024  Patient name:              Tian Schmitz  YOB: 1940  Sex:    male   MRN:   [id number]    Primary Care Physician:  Antony Garcia MD    Cardiologist:  Trey Macedo MD    Reason for Consultation:  AV block, evaluation for pacemaker implantation.     Clinical Summary:  84 years old male had a syncopal episode around 3 weeks ago when he was in his cabin in [place].  His wife noticed that he became pale and slumped onto the floor losing his consciousness for few seconds.  Remained well until 2 days ago when he had similar multiple episodes witnessed by his wife.  Checked vitals and his heart rate was 120s. He was evaluated at a local emergency room and noted to be in complete AV block with slow ventricular escape.  He due to altered mental status he was intubated, received atropine and started dopamine.  Transferred here and left subclavian TVP placed. He has remained fairly stable except for mild fever.  No chest pain, shortness of breath, nausea, vomiting or diaphoresis prior to syncope.  No exposure to tick bites or use of negative chronotropic agents or AV blocking agents.  No prior cardiac surgery or interventions.  Labs: Cr 0.6, K 4.2, , 159, AST 90, WBC 12.7, Hb 10, ,. And TSH 3.0. CXR mild interstitial infiltrate. CT chest: bilateral small nodules.  Baseline ECG right bundle branch block and left posterior fascicular block. Medical history: Splenic flexure cancer s/p partial colectomy, partial gastrectomy and Billroth II  Gastrojejunostomy on immunotherapy (Pembrolizumab).     Review of systems:  Constitutional: Negative for chills and fever  HENT: Negative for congestion, sinus pressure, sneezing and sore throat.    Eyes: Negative for pain, discharge, redness and 
   midazolam      sodium bicarbonate 150 mEq in dextrose 5 % 1,000 mL infusion 150 mL/hr at 08/29/24 1042    sodium chloride 20 mL/hr at 08/29/24 0851     PRN Meds:.sodium chloride flush, sodium chloride, potassium chloride **OR** potassium chloride, magnesium sulfate, ondansetron **OR** ondansetron, polyethylene glycol, acetaminophen **OR** acetaminophen    No Known Allergies  No family history on file.  Social History     Socioeconomic History    Marital status:      Spouse name: Not on file    Number of children: Not on file    Years of education: Not on file    Highest education level: Not on file   Occupational History    Not on file   Tobacco Use    Smoking status: Former     Types: Cigarettes    Smokeless tobacco: Never   Substance and Sexual Activity    Alcohol use: Never    Drug use: Never    Sexual activity: Not on file   Other Topics Concern    Not on file   Social History Narrative    Not on file     Social Determinants of Health     Financial Resource Strain: Low Risk  (8/14/2024)    Received from [facility]    Financial Resource Strain     In the past 12 months, have you experienced difficulty paying for basic needs like housing, utilities, food, transportation, or clothing: Not on file     Are there any financial concerns that limit you from seeing the doctor?: Not on file   Food Insecurity: No Food Insecurity (5/6/2024)    Received from [facility]    Hunger Vital Sign     Worried About Running Out of Food in the Last Year: Never true     Ran Out of Food in the Last Year: Never true   Transportation Needs: Low Risk  (8/14/2024)    Received from [facility]    Transportation Needs     In the past 12 months, has lack of transportation kept you from medical appointments or from getting medications?: Not on file     In the past 12 months, has lack of transportation kept you from meetings, work, or getting things needed for daily living?: Not on file

## 2024-09-02 PROBLEM — R00.1 SYMPTOMATIC BRADYCARDIA: Status: ACTIVE | Noted: 2024-09-02

## 2024-09-02 LAB
ANION GAP SERPL CALC-SCNC: 11 MEQ/L (ref 8–16)
BUN SERPL-MCNC: 14 MG/DL (ref 7–22)
CA-I BLD ISE-SCNC: 1.13 MMOL/L (ref 1.12–1.32)
CALCIUM SERPL-MCNC: 7.9 MG/DL (ref 8.5–10.5)
CHLORIDE SERPL-SCNC: 110 MEQ/L (ref 98–111)
CO2 SERPL-SCNC: 19 MEQ/L (ref 23–33)
CREAT SERPL-MCNC: 0.5 MG/DL (ref 0.4–1.2)
GFR SERPL CREATININE-BSD FRML MDRD: > 90 ML/MIN/1.73M2
GLUCOSE SERPL-MCNC: 95 MG/DL (ref 70–108)
MAGNESIUM SERPL-MCNC: 2 MG/DL (ref 1.6–2.4)
POTASSIUM SERPL-SCNC: 4 MEQ/L (ref 3.5–5.2)
SODIUM SERPL-SCNC: 140 MEQ/L (ref 135–145)

## 2024-09-02 PROCEDURE — 82330 ASSAY OF CALCIUM: CPT

## 2024-09-02 PROCEDURE — 6370000000 HC RX 637 (ALT 250 FOR IP)

## 2024-09-02 PROCEDURE — 6360000002 HC RX W HCPCS

## 2024-09-02 PROCEDURE — 36415 COLL VENOUS BLD VENIPUNCTURE: CPT

## 2024-09-02 PROCEDURE — 94761 N-INVAS EAR/PLS OXIMETRY MLT: CPT

## 2024-09-02 PROCEDURE — 2000000000 HC ICU R&B

## 2024-09-02 PROCEDURE — 99232 SBSQ HOSP IP/OBS MODERATE 35: CPT | Performed by: PHYSICIAN ASSISTANT

## 2024-09-02 PROCEDURE — 83735 ASSAY OF MAGNESIUM: CPT

## 2024-09-02 PROCEDURE — 80048 BASIC METABOLIC PNL TOTAL CA: CPT

## 2024-09-02 PROCEDURE — 99222 1ST HOSP IP/OBS MODERATE 55: CPT

## 2024-09-02 PROCEDURE — 2580000003 HC RX 258

## 2024-09-02 RX ADMIN — CALCIUM POLYCARBOPHIL 625 MG: 625 TABLET, FILM COATED ORAL at 09:15

## 2024-09-02 RX ADMIN — SIMETHICONE 80 MG: 80 TABLET, CHEWABLE ORAL at 12:37

## 2024-09-02 RX ADMIN — Medication 1000 UNITS: at 09:15

## 2024-09-02 RX ADMIN — DOXYCYCLINE HYCLATE 100 MG: 100 TABLET, COATED ORAL at 09:15

## 2024-09-02 RX ADMIN — PANTOPRAZOLE SODIUM 40 MG: 40 TABLET, DELAYED RELEASE ORAL at 06:19

## 2024-09-02 RX ADMIN — SODIUM CHLORIDE, PRESERVATIVE FREE 10 ML: 5 INJECTION INTRAVENOUS at 09:15

## 2024-09-02 RX ADMIN — CALCIUM POLYCARBOPHIL 625 MG: 625 TABLET, FILM COATED ORAL at 21:55

## 2024-09-02 RX ADMIN — SODIUM CHLORIDE, PRESERVATIVE FREE 10 ML: 5 INJECTION INTRAVENOUS at 19:43

## 2024-09-02 RX ADMIN — SIMETHICONE 80 MG: 80 TABLET, CHEWABLE ORAL at 18:34

## 2024-09-02 RX ADMIN — DOXYCYCLINE HYCLATE 100 MG: 100 TABLET, COATED ORAL at 19:42

## 2024-09-02 RX ADMIN — ENOXAPARIN SODIUM 40 MG: 100 INJECTION SUBCUTANEOUS at 09:15

## 2024-09-02 RX ADMIN — SIMETHICONE 80 MG: 80 TABLET, CHEWABLE ORAL at 06:19

## 2024-09-02 RX ADMIN — CETIRIZINE HYDROCHLORIDE 5 MG: 5 TABLET ORAL at 09:15

## 2024-09-02 ASSESSMENT — PAIN SCALES - GENERAL: PAINLEVEL_OUTOF10: 0

## 2024-09-02 ASSESSMENT — ENCOUNTER SYMPTOMS
CONSTIPATION: 0
SHORTNESS OF BREATH: 0
NAUSEA: 0
CHEST TIGHTNESS: 0
VOMITING: 0

## 2024-09-03 ENCOUNTER — APPOINTMENT (OUTPATIENT)
Dept: GENERAL RADIOLOGY | Age: 84
DRG: 242 | End: 2024-09-03
Attending: INTERNAL MEDICINE
Payer: MEDICARE

## 2024-09-03 LAB
ALBUMIN SERPL BCG-MCNC: 2.4 G/DL (ref 3.5–5.1)
ALP SERPL-CCNC: 96 U/L (ref 38–126)
ALT SERPL W/O P-5'-P-CCNC: 45 U/L (ref 11–66)
ANION GAP SERPL CALC-SCNC: 9 MEQ/L (ref 8–16)
AST SERPL-CCNC: 13 U/L (ref 5–40)
B BURGDOR.VLSE1+PEPC10 AB SER IA-ACNC: 0.16 IV
BILIRUB SERPL-MCNC: 0.9 MG/DL (ref 0.3–1.2)
BUN SERPL-MCNC: 15 MG/DL (ref 7–22)
CA-I BLD ISE-SCNC: 1.15 MMOL/L (ref 1.12–1.32)
CALCIUM SERPL-MCNC: 7.8 MG/DL (ref 8.5–10.5)
CHLORIDE SERPL-SCNC: 108 MEQ/L (ref 98–111)
CO2 SERPL-SCNC: 20 MEQ/L (ref 23–33)
CREAT SERPL-MCNC: 0.5 MG/DL (ref 0.4–1.2)
DEPRECATED RDW RBC AUTO: 51.8 FL (ref 35–45)
EKG ATRIAL RATE: 82 BPM
EKG P AXIS: -4 DEGREES
EKG P-R INTERVAL: 246 MS
EKG Q-T INTERVAL: 410 MS
EKG QRS DURATION: 134 MS
EKG QTC CALCULATION (BAZETT): 479 MS
EKG R AXIS: -92 DEGREES
EKG T AXIS: 51 DEGREES
EKG VENTRICULAR RATE: 82 BPM
ERYTHROCYTE [DISTWIDTH] IN BLOOD BY AUTOMATED COUNT: 17.3 % (ref 11.5–14.5)
GFR SERPL CREATININE-BSD FRML MDRD: > 90 ML/MIN/1.73M2
GLUCOSE SERPL-MCNC: 90 MG/DL (ref 70–108)
HCT VFR BLD AUTO: 34.2 % (ref 42–52)
HGB BLD-MCNC: 11.2 GM/DL (ref 14–18)
INR PPP: 1.14 (ref 0.85–1.13)
MAGNESIUM SERPL-MCNC: 1.9 MG/DL (ref 1.6–2.4)
MCH RBC QN AUTO: 26.9 PG (ref 26–33)
MCHC RBC AUTO-ENTMCNC: 32.7 GM/DL (ref 32.2–35.5)
MCV RBC AUTO: 82.2 FL (ref 80–94)
PHOSPHATE SERPL-MCNC: 4 MG/DL (ref 2.4–4.7)
PLATELET # BLD AUTO: 187 THOU/MM3 (ref 130–400)
PMV BLD AUTO: 11.2 FL (ref 9.4–12.4)
POTASSIUM SERPL-SCNC: 4.2 MEQ/L (ref 3.5–5.2)
PROT SERPL-MCNC: 5 G/DL (ref 6.1–8)
RBC # BLD AUTO: 4.16 MILL/MM3 (ref 4.7–6.1)
SODIUM SERPL-SCNC: 137 MEQ/L (ref 135–145)
WBC # BLD AUTO: 8.7 THOU/MM3 (ref 4.8–10.8)

## 2024-09-03 PROCEDURE — 85027 COMPLETE CBC AUTOMATED: CPT

## 2024-09-03 PROCEDURE — 33208 INSRT HEART PM ATRIAL & VENT: CPT | Performed by: INTERNAL MEDICINE

## 2024-09-03 PROCEDURE — 71046 X-RAY EXAM CHEST 2 VIEWS: CPT

## 2024-09-03 PROCEDURE — 97530 THERAPEUTIC ACTIVITIES: CPT

## 2024-09-03 PROCEDURE — 99152 MOD SED SAME PHYS/QHP 5/>YRS: CPT | Performed by: INTERNAL MEDICINE

## 2024-09-03 PROCEDURE — 6370000000 HC RX 637 (ALT 250 FOR IP)

## 2024-09-03 PROCEDURE — 33225 L VENTRIC PACING LEAD ADD-ON: CPT | Performed by: INTERNAL MEDICINE

## 2024-09-03 PROCEDURE — 6360000002 HC RX W HCPCS: Performed by: INTERNAL MEDICINE

## 2024-09-03 PROCEDURE — 80053 COMPREHEN METABOLIC PANEL: CPT

## 2024-09-03 PROCEDURE — 2580000003 HC RX 258

## 2024-09-03 PROCEDURE — 93010 ELECTROCARDIOGRAM REPORT: CPT | Performed by: INTERNAL MEDICINE

## 2024-09-03 PROCEDURE — 84100 ASSAY OF PHOSPHORUS: CPT

## 2024-09-03 PROCEDURE — 99291 CRITICAL CARE FIRST HOUR: CPT | Performed by: INTERNAL MEDICINE

## 2024-09-03 PROCEDURE — C1785 PMKR, DUAL, RATE-RESP: HCPCS | Performed by: INTERNAL MEDICINE

## 2024-09-03 PROCEDURE — C1769 GUIDE WIRE: HCPCS | Performed by: INTERNAL MEDICINE

## 2024-09-03 PROCEDURE — C1892 INTRO/SHEATH,FIXED,PEEL-AWAY: HCPCS | Performed by: INTERNAL MEDICINE

## 2024-09-03 PROCEDURE — 36415 COLL VENOUS BLD VENIPUNCTURE: CPT

## 2024-09-03 PROCEDURE — 93005 ELECTROCARDIOGRAM TRACING: CPT | Performed by: INTERNAL MEDICINE

## 2024-09-03 PROCEDURE — 2709999900 HC NON-CHARGEABLE SUPPLY: Performed by: INTERNAL MEDICINE

## 2024-09-03 PROCEDURE — 02H63JZ INSERTION OF PACEMAKER LEAD INTO RIGHT ATRIUM, PERCUTANEOUS APPROACH: ICD-10-PCS | Performed by: INTERNAL MEDICINE

## 2024-09-03 PROCEDURE — 2140000000 HC CCU INTERMEDIATE R&B

## 2024-09-03 PROCEDURE — C1898 LEAD, PMKR, OTHER THAN TRANS: HCPCS | Performed by: INTERNAL MEDICINE

## 2024-09-03 PROCEDURE — 6360000002 HC RX W HCPCS

## 2024-09-03 PROCEDURE — 99153 MOD SED SAME PHYS/QHP EA: CPT | Performed by: INTERNAL MEDICINE

## 2024-09-03 PROCEDURE — C1894 INTRO/SHEATH, NON-LASER: HCPCS | Performed by: INTERNAL MEDICINE

## 2024-09-03 PROCEDURE — 2580000003 HC RX 258: Performed by: INTERNAL MEDICINE

## 2024-09-03 PROCEDURE — 2500000003 HC RX 250 WO HCPCS: Performed by: INTERNAL MEDICINE

## 2024-09-03 PROCEDURE — 97162 PT EVAL MOD COMPLEX 30 MIN: CPT

## 2024-09-03 PROCEDURE — 85610 PROTHROMBIN TIME: CPT

## 2024-09-03 PROCEDURE — 83735 ASSAY OF MAGNESIUM: CPT

## 2024-09-03 PROCEDURE — 97116 GAIT TRAINING THERAPY: CPT

## 2024-09-03 PROCEDURE — 82330 ASSAY OF CALCIUM: CPT

## 2024-09-03 PROCEDURE — 05PYX3Z REMOVAL OF INFUSION DEVICE FROM UPPER VEIN, EXTERNAL APPROACH: ICD-10-PCS | Performed by: INTERNAL MEDICINE

## 2024-09-03 PROCEDURE — 0JH606Z INSERTION OF PACEMAKER, DUAL CHAMBER INTO CHEST SUBCUTANEOUS TISSUE AND FASCIA, OPEN APPROACH: ICD-10-PCS | Performed by: INTERNAL MEDICINE

## 2024-09-03 PROCEDURE — 02HK3JZ INSERTION OF PACEMAKER LEAD INTO RIGHT VENTRICLE, PERCUTANEOUS APPROACH: ICD-10-PCS | Performed by: INTERNAL MEDICINE

## 2024-09-03 DEVICE — LEAD 5076-52 MRI US RCMCRD
Type: IMPLANTABLE DEVICE | Status: FUNCTIONAL
Brand: CAPSUREFIX NOVUS MRI™ SURESCAN®

## 2024-09-03 DEVICE — IPG W1DR01 AZURE XT DR MRI USA
Type: IMPLANTABLE DEVICE | Status: FUNCTIONAL
Brand: AZURE™ XT DR MRI SURESCAN™

## 2024-09-03 DEVICE — LEAD 3830 US MKT/ 69CM MRI LBBAP
Type: IMPLANTABLE DEVICE | Status: FUNCTIONAL
Brand: SELECTSECURE™ MRI SURESCAN™

## 2024-09-03 RX ORDER — MIDAZOLAM HYDROCHLORIDE 1 MG/ML
INJECTION INTRAMUSCULAR; INTRAVENOUS PRN
Status: DISCONTINUED | OUTPATIENT
Start: 2024-09-03 | End: 2024-09-03 | Stop reason: HOSPADM

## 2024-09-03 RX ORDER — FENTANYL CITRATE 50 UG/ML
INJECTION, SOLUTION INTRAMUSCULAR; INTRAVENOUS PRN
Status: DISCONTINUED | OUTPATIENT
Start: 2024-09-03 | End: 2024-09-03 | Stop reason: HOSPADM

## 2024-09-03 RX ORDER — MAGNESIUM SULFATE IN WATER 40 MG/ML
2000 INJECTION, SOLUTION INTRAVENOUS ONCE
Status: COMPLETED | OUTPATIENT
Start: 2024-09-03 | End: 2024-09-03

## 2024-09-03 RX ADMIN — SIMETHICONE 80 MG: 80 TABLET, CHEWABLE ORAL at 06:31

## 2024-09-03 RX ADMIN — Medication 1000 UNITS: at 08:32

## 2024-09-03 RX ADMIN — SODIUM CHLORIDE, PRESERVATIVE FREE 10 ML: 5 INJECTION INTRAVENOUS at 21:47

## 2024-09-03 RX ADMIN — MAGNESIUM SULFATE HEPTAHYDRATE 2000 MG: 40 INJECTION, SOLUTION INTRAVENOUS at 16:34

## 2024-09-03 RX ADMIN — SIMETHICONE 80 MG: 80 TABLET, CHEWABLE ORAL at 00:04

## 2024-09-03 RX ADMIN — CALCIUM POLYCARBOPHIL 625 MG: 625 TABLET, FILM COATED ORAL at 21:46

## 2024-09-03 RX ADMIN — SODIUM CHLORIDE: 9 INJECTION, SOLUTION INTRAVENOUS at 00:09

## 2024-09-03 RX ADMIN — CETIRIZINE HYDROCHLORIDE 5 MG: 5 TABLET ORAL at 08:32

## 2024-09-03 RX ADMIN — PANTOPRAZOLE SODIUM 40 MG: 40 TABLET, DELAYED RELEASE ORAL at 06:31

## 2024-09-03 RX ADMIN — DOXYCYCLINE HYCLATE 100 MG: 100 TABLET, COATED ORAL at 21:46

## 2024-09-03 RX ADMIN — SODIUM CHLORIDE 2000 MG: 900 INJECTION INTRAVENOUS at 19:00

## 2024-09-03 RX ADMIN — DOXYCYCLINE HYCLATE 100 MG: 100 TABLET, COATED ORAL at 08:32

## 2024-09-03 RX ADMIN — SODIUM CHLORIDE, PRESERVATIVE FREE 10 ML: 5 INJECTION INTRAVENOUS at 08:31

## 2024-09-03 RX ADMIN — CALCIUM POLYCARBOPHIL 625 MG: 625 TABLET, FILM COATED ORAL at 08:32

## 2024-09-03 ASSESSMENT — PAIN SCALES - GENERAL
PAINLEVEL_OUTOF10: 0

## 2024-09-03 NOTE — H&P
[facility]  HEART CENTER (EP)  [address] [address]  H&P and SEDATION/ANALGESIA     Patient demographics:  Date:   9/3/2024  Patient name: Tian Schmitz  YOB: 1940  Sex: male   MRN:   [id number]    Reason for admission or planned procedure:  Dual Chamber pacemaker implantation    Code Status: Full Code    Consent:The risk and benefits of dual chamber transvenous pacemaker implantation including pneumothorax, hemothorax, bleeding, vascular complications, infection, pericardial tamponade requiring emergency pericardiocentesis, MI, death, exposure to radiation, lead dislodgement requiring reposition, future operations for generator change or device revision or upgrade were discussed with the patient. He verbalized understanding of the discussion. His questions were answered. The patient wishes to proceed.      Brief clinical summary:  84/M admitted to the hospital with recurrent syncope due to paroxysmal complete AV block with slow and wide QRS escape @ 32 BPM, status post temporary pacer catheter placement. Low normal LVEF. Off levophed. Afebrile and normal white cell count. LFT normalized. 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     Past Medical History:  No past medical history on file.    Past Surgical History:  Past Surgical History:   Procedure Laterality Date    UPPER GASTROINTESTINAL ENDOSCOPY N/A 4/22/2024    ESOPHAGOGASTRODUODENOSCOPY BIOPSY performed by Geovanni Lynne MD at [facility] ENDOSCOPY        Allergies:   Allergies as of 08/29/2024    (No Known Allergies)     Additional information:       Medications     Current Facility-Administered Medications:     polycarbophil (FIBERCON) tablet 625 mg, 625 mg, Oral, BID, Gunawardena, Ruvinda S, DO, 625 mg at 09/02/24 3968    cetirizine (ZYRTEC) 
breath, abnormal urinary symptoms, blood in urine and stool, headache, dizziness, numbness/tingling.  Patient endorses fatigue.     Scheduled Meds:   sodium chloride flush  5-40 mL IntraVENous 2 times per day    doxycycline hyclate  100 mg Oral 2 times per day     Continuous Infusions:   sodium chloride 20 mL/hr at 08/29/24 0851       PHYSICAL EXAMINATION:  T:  97.  P:  100. RR:  10. B/P:  116/77. O2 Sat:  100.  I/O:  +0.7 (8/29)  Body mass index is 19.89 kg/m².   GCS:   11  General: Patient just extubated; responsive to questioning  HEENT:  normocephalic and atraumatic.  No scleral icterus. PERR  Neck: supple.  No Thyromegaly.  Lungs: clear to auscultation. No retractions  Cardiac: RRR. No JVD.  Abdomen: soft. Nontender.  Extremities:  No clubbing, cyanosis, or edema x 4.    Vasculature: capillary refill < 3 seconds. Palpable dorsalis pedis pulses.  Skin:  warm and dry.  Psych:  Patient just extubated; responsive to questioning  Lymph:  No supraclavicular adenopathy.  Neurologic:  No focal deficit. No seizures.    Data: (All radiographs, tracings, PFTs, and imaging are personally viewed and interpreted unless otherwise noted).   Sodium 129. Potassium 5.6, chloride 103, bicarb 10, BUN 26, creatinine 0.9, ionized calcium 1.07, POC glucose 129, calcium 7.3, phosphorous 5.5, total protein 4.9  Albumin 2.9, alk phos 155, , , total protein 4.9  WBC 17, RBC 3.89, hemoglobin 10.4, hematocrit 33.8, platelet count 203  INR 1.13  APTT 71  8/29 chest x-ray: No pneumothorax is seen. Mild interstitial pneumonitis/edema/fibrosis both lung bases. Small patch consolidation retrocardiac region left lung base.  Telemetry shows transvenously paced rhythm at 100 bpm       Case and plan discussed with Dr. Bryan.      Electronically signed by Mino Haney DO  CRITICAL CARE SPECIALIST   Patient seen by me including key components of medical care.  Case discussed with resident physician.  After TVP placed, patient

## 2024-09-04 ENCOUNTER — TELEPHONE (OUTPATIENT)
Dept: UROLOGY | Age: 84
End: 2024-09-04

## 2024-09-04 ENCOUNTER — APPOINTMENT (OUTPATIENT)
Dept: ULTRASOUND IMAGING | Age: 84
DRG: 242 | End: 2024-09-04
Attending: INTERNAL MEDICINE
Payer: MEDICARE

## 2024-09-04 VITALS
DIASTOLIC BLOOD PRESSURE: 67 MMHG | WEIGHT: 153 LBS | HEART RATE: 81 BPM | SYSTOLIC BLOOD PRESSURE: 111 MMHG | OXYGEN SATURATION: 98 % | HEIGHT: 73 IN | BODY MASS INDEX: 20.28 KG/M2 | TEMPERATURE: 98.2 F | RESPIRATION RATE: 18 BRPM

## 2024-09-04 PROBLEM — I44.2 COMPLETE HEART BLOCK (HCC): Status: ACTIVE | Noted: 2024-09-04

## 2024-09-04 LAB
BACTERIA SPEC RESP CULT: ABNORMAL
BACTERIA SPEC RESP CULT: ABNORMAL
EKG ATRIAL RATE: 80 BPM
EKG ATRIAL RATE: 89 BPM
EKG ATRIAL RATE: 91 BPM
EKG P AXIS: -21 DEGREES
EKG P AXIS: 117 DEGREES
EKG P AXIS: 17 DEGREES
EKG P AXIS: 45 DEGREES
EKG P AXIS: 59 DEGREES
EKG P-R INTERVAL: 148 MS
EKG P-R INTERVAL: 158 MS
EKG P-R INTERVAL: 216 MS
EKG P-R INTERVAL: 220 MS
EKG P-R INTERVAL: 232 MS
EKG Q-T INTERVAL: 348 MS
EKG Q-T INTERVAL: 354 MS
EKG Q-T INTERVAL: 362 MS
EKG Q-T INTERVAL: 382 MS
EKG Q-T INTERVAL: 394 MS
EKG QRS DURATION: 128 MS
EKG QRS DURATION: 82 MS
EKG QRS DURATION: 82 MS
EKG QRS DURATION: 86 MS
EKG QRS DURATION: 92 MS
EKG QTC CALCULATION (BAZETT): 423 MS
EKG QTC CALCULATION (BAZETT): 435 MS
EKG QTC CALCULATION (BAZETT): 440 MS
EKG QTC CALCULATION (BAZETT): 454 MS
EKG QTC CALCULATION (BAZETT): 464 MS
EKG R AXIS: -17 DEGREES
EKG R AXIS: -20 DEGREES
EKG R AXIS: -22 DEGREES
EKG R AXIS: -24 DEGREES
EKG R AXIS: -86 DEGREES
EKG T AXIS: 142 DEGREES
EKG T AXIS: 143 DEGREES
EKG T AXIS: 147 DEGREES
EKG T AXIS: 148 DEGREES
EKG T AXIS: 37 DEGREES
EKG VENTRICULAR RATE: 80 BPM
EKG VENTRICULAR RATE: 89 BPM
EKG VENTRICULAR RATE: 91 BPM
GRAM STN SPEC: ABNORMAL
ORGANISM: ABNORMAL

## 2024-09-04 PROCEDURE — 93005 ELECTROCARDIOGRAM TRACING: CPT | Performed by: INTERNAL MEDICINE

## 2024-09-04 PROCEDURE — 76770 US EXAM ABDO BACK WALL COMP: CPT

## 2024-09-04 PROCEDURE — 36415 COLL VENOUS BLD VENIPUNCTURE: CPT

## 2024-09-04 PROCEDURE — 93010 ELECTROCARDIOGRAM REPORT: CPT | Performed by: INTERNAL MEDICINE

## 2024-09-04 PROCEDURE — 6370000000 HC RX 637 (ALT 250 FOR IP): Performed by: NURSE PRACTITIONER

## 2024-09-04 PROCEDURE — 6370000000 HC RX 637 (ALT 250 FOR IP)

## 2024-09-04 PROCEDURE — 2580000003 HC RX 258

## 2024-09-04 PROCEDURE — 86618 LYME DISEASE ANTIBODY: CPT

## 2024-09-04 PROCEDURE — 97166 OT EVAL MOD COMPLEX 45 MIN: CPT

## 2024-09-04 PROCEDURE — 97530 THERAPEUTIC ACTIVITIES: CPT

## 2024-09-04 PROCEDURE — 6370000000 HC RX 637 (ALT 250 FOR IP): Performed by: INTERNAL MEDICINE

## 2024-09-04 RX ORDER — METOPROLOL SUCCINATE 25 MG/1
25 TABLET, EXTENDED RELEASE ORAL DAILY
Qty: 30 TABLET | Refills: 3 | Status: SHIPPED | OUTPATIENT
Start: 2024-09-05

## 2024-09-04 RX ORDER — TAMSULOSIN HYDROCHLORIDE 0.4 MG/1
0.4 CAPSULE ORAL DAILY
Status: DISCONTINUED | OUTPATIENT
Start: 2024-09-04 | End: 2024-09-04 | Stop reason: HOSPADM

## 2024-09-04 RX ORDER — METOPROLOL SUCCINATE 25 MG/1
25 TABLET, EXTENDED RELEASE ORAL DAILY
Status: DISCONTINUED | OUTPATIENT
Start: 2024-09-04 | End: 2024-09-04 | Stop reason: HOSPADM

## 2024-09-04 RX ORDER — TAMSULOSIN HYDROCHLORIDE 0.4 MG/1
0.4 CAPSULE ORAL DAILY
Qty: 30 CAPSULE | Refills: 3 | Status: SHIPPED | OUTPATIENT
Start: 2024-09-05

## 2024-09-04 RX ORDER — DOXYCYCLINE HYCLATE 100 MG
100 TABLET ORAL EVERY 12 HOURS SCHEDULED
Qty: 30 TABLET | Refills: 0 | Status: SHIPPED | OUTPATIENT
Start: 2024-09-04 | End: 2024-09-19

## 2024-09-04 RX ADMIN — METOPROLOL SUCCINATE 25 MG: 25 TABLET, FILM COATED, EXTENDED RELEASE ORAL at 13:07

## 2024-09-04 RX ADMIN — CETIRIZINE HYDROCHLORIDE 5 MG: 5 TABLET ORAL at 08:28

## 2024-09-04 RX ADMIN — Medication 1000 UNITS: at 08:28

## 2024-09-04 RX ADMIN — TAMSULOSIN HYDROCHLORIDE 0.4 MG: 0.4 CAPSULE ORAL at 10:00

## 2024-09-04 RX ADMIN — PANTOPRAZOLE SODIUM 40 MG: 40 TABLET, DELAYED RELEASE ORAL at 05:52

## 2024-09-04 RX ADMIN — CALCIUM POLYCARBOPHIL 625 MG: 625 TABLET, FILM COATED ORAL at 10:00

## 2024-09-04 RX ADMIN — SODIUM CHLORIDE, PRESERVATIVE FREE 10 ML: 5 INJECTION INTRAVENOUS at 08:28

## 2024-09-04 RX ADMIN — DOXYCYCLINE HYCLATE 100 MG: 100 TABLET, COATED ORAL at 08:28

## 2024-09-04 NOTE — TELEPHONE ENCOUNTER
Consult for urinary retention  B/l hydro - resolved  High volume retention  Started on flomax 9/4  Will need OV in 1-2 wks to establish care and possible VT

## 2024-09-04 NOTE — DISCHARGE SUMMARY
Discharge Summary    Patient:  Tian Schmitz  YOB: 1940    MRN: [id number]   Acct: [id number]    Primary Care Physician: Antony Garcia MD    Admit date:  8/29/2024    Discharge date:   9/4/2024        Discharge Diagnoses:   Bradycardia  Principal Problem:    Bradycardia  Active Problems:    Severe malnutrition (HCC)    Symptomatic bradycardia  Resolved Problems:    * No resolved hospital problems. *        Admitted for: (HPI) symptomatic bradycardia    Hospital Course:This pt presented to hospital weak and looking pale.  He was found to have 3d degree AV block ( strips not available) and he was admitted to ICU.  He had been started on doxycyline having been in [place] ( reason unclear) on a presumption of Lyme disease.    The pt was initially intubated.  He was seen by Cardiology.  A transvenous pacer was placed.    He ultimately underwent permanent pacer on 9.3.      He was noted to have bilateral hydronephrosis which resolved with harrell placement.  Urology followed the pt.  He was discharged with harrell in place and placed on Flomax.  He will follow up with their office.      He was noted to have elevated troponins on admission, thought to be due to demand ischemia from bradycardia.  Further workup will be left up to Cardiology.    A Lyme titer was ordered the day of discharge.      Consultants:  See above      Discharge Medications:       Medication List        START taking these medications      metoprolol succinate 25 MG extended release tablet  Commonly known as: TOPROL XL  Take 1 tablet by mouth daily  Start taking on: September 5, 2024     tamsulosin 0.4 MG capsule  Commonly known as: FLOMAX  Take 1 capsule by mouth daily  Start taking on: September 5, 2024            CONTINUE taking these medications      pantoprazole 40 MG tablet  Commonly known as: PROTONIX  Take 1 tablet by mouth every morning (before breakfast)            STOP taking these medications      docusate

## 2024-09-04 NOTE — PROCEDURES
PROCEDURE NOTE  Date: 9/4/2024   Name: Tian Schmitz  YOB: 1940    Procedures  EKG completed, given to Leni HENDERSON

## 2024-09-04 NOTE — SIGNIFICANT EVENT
Hand off received from Dr. Aravind Valera patent will be transferred to 3B27. Hospitalist has been changed to attending provider.

## 2024-09-04 NOTE — DISCHARGE INSTRUCTIONS
Follow with PPM clnic 1 week   Follow with Dr. Domingo... 4-5 weeks sp PPM  EF 45%    Going home Instructions for your Pacemaker / Defibrillator      Congratulations on your new pacemaker / defibrillator. We, as your dedicated physicians, understand that your new pacemaker / defibrillator can be a bit intimidating. We understand that you may have many questions and it is hard to remember to ask all those questions while in the hospital. As your physicians, we are dedicated to your care. We would like to assist you with your new pacemaker / defibrillator. Here are some tips and advice on how to take care of your new pacemaker / defibrillator; when you need to be concerned and when you need to call us.     You may notice a bruise over your pacemaker / defibrillator. This is not unusual. If you develop fever, bleeding, swelling or drainage from the incision, please notify our office immediately ([phone]).   You may have steri-strips over your incision. Do not try to remove them. Allow them to come off by themselves.  You may have staples securing your incision. When you visit us in the office, we will remove them- usually in 7-10 days time.  You will have a dressing over you pacemaker incision. Please keep this dry and clean. Take the dressing off in 48 hours. Wash around the incision but keep the incision dry. When we see you in the office in  1 week - you will be instructed on showering.  Do not shower until your pacemaker / defibrillator  incision is healed. Usually in 1 week - we will let you know at your 1 week visit  You should not lift your arm above the level of your shoulder for two weeks. This will allow the pacemaker wires to heal in place. It is important to move your arms as normal as possible (without going above the shoulder) to prevent the development of a frozen shoulder.   You should not engage in sports or lift objects heavier than 10 lbs. for the first two weeks after pacemaker

## 2024-09-04 NOTE — PROCEDURES
[facility]  [facility] (EP)  [address] [address]  Dept: [phone]  ELECTROPHYSIOLOGY PROCEDURE NOTE  Patients Demographics:  Date:   9/3/2024  Patient name:              Tian Schmitz  YOB: 1940  Sex: male   MRN:   [id number]    Primary Care Provider:    Antony Garcia MD     Procedure Planned:  Dual chamber pacemaker implantation.  Removal of temporary pacing catheter.     Cardiologist:  Trey Macedo MD     Indications for Procedure  Complete AV block.     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 Low normal LVEF. Off levophed. Afebrile and normal white cell count. LFT normalized. 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      Procedure Performed:  Dual chamber pacemaker implantation.  Removal of temporary pacing catheter.     Procedure Details:  The patient was brought to the electrophysiology lab in a fasting and non-sedated state. He was prepped and draped in the usual sterile fashion.  Fentanyl and midazolam was used for for conscious sedation and analgesia respectively. The left pectoral region was liberally infiltrated with 2% lidocaine for local anesthesia.  Left axillary vein was cannulated x2 under ultrasound guidance using 21G micro-puncture needle and 2 standard J-tip 0.035 inch guidewires were advanced into IVC. A 4 cm long incision was made in the left pectoral region using #10 blade and a subcutaneous pocket was made. Two 7-Armenian hemostatic peel away sheaths were advanced into the axillary vein over the guide wires. Through one of the sheaths, a 7F fixed curve curve delivery sheath (C315) was advanced over the

## 2024-09-04 NOTE — PLAN OF CARE
Problem: Discharge Planning  Goal: Discharge to home or other facility with appropriate resources  9/3/2024 2340 by Bal Pacheco RN  Outcome: Progressing  9/3/2024 1043 by Geovanni Heck RN  Outcome: Progressing  Flowsheets (Taken 8/31/2024 2000 by Eva Thakur, RN)  Discharge to home or other facility with appropriate resources:   Identify barriers to discharge with patient and caregiver   Arrange for needed discharge resources and transportation as appropriate   Identify discharge learning needs (meds, wound care, etc)     Problem: Skin/Tissue Integrity  Goal: Absence of new skin breakdown  Description: 1.  Monitor for areas of redness and/or skin breakdown  2.  Assess vascular access sites hourly  3.  Every 4-6 hours minimum:  Change oxygen saturation probe site  4.  Every 4-6 hours:  If on nasal continuous positive airway pressure, respiratory therapy assess nares and determine need for appliance change or resting period.  9/3/2024 2340 by Bal Pacheco RN  Outcome: Progressing  9/3/2024 1043 by Geovanni Heck RN  Outcome: Progressing  Note: Turn and reposition, low air loss mattress     Problem: Neurosensory - Adult  Goal: Achieves stable or improved neurological status  Description: Pt unresponsive on admission  9/3/2024 2340 by Bal Pacheco RN  Outcome: Progressing  9/3/2024 1043 by Geovanni Heck RN  Outcome: Progressing  Flowsheets (Taken 8/31/2024 2000 by Eva Thakur, RN)  Achieves stable or improved neurological status:   Assess for and report changes in neurological status   Initiate measures to prevent increased intracranial pressure  Goal: Achieves maximal functionality and self care  9/3/2024 2340 by Bal Pacheco RN  Outcome: Progressing  9/3/2024 1043 by Geovanni Heck RN  Outcome: Progressing  Flowsheets (Taken 8/31/2024 2000 by Eva Thakur, RN)  Achieves maximal functionality and self care: Monitor swallowing and airway patency with patient fatigue and changes in 
  Problem: Discharge Planning  Goal: Discharge to home or other facility with appropriate resources  9/4/2024 1322 by Leni Stinson RN  Outcome: Adequate for Discharge  9/3/2024 2340 by Bal Pacheco RN  Outcome: Progressing     Problem: Skin/Tissue Integrity  Goal: Absence of new skin breakdown  Description: 1.  Monitor for areas of redness and/or skin breakdown  2.  Assess vascular access sites hourly  3.  Every 4-6 hours minimum:  Change oxygen saturation probe site  4.  Every 4-6 hours:  If on nasal continuous positive airway pressure, respiratory therapy assess nares and determine need for appliance change or resting period.  9/4/2024 1322 by Leni Stinson RN  Outcome: Adequate for Discharge  9/3/2024 2340 by Bal Pacheco RN  Outcome: Progressing     Problem: Neurosensory - Adult  Goal: Achieves stable or improved neurological status  Description: Pt unresponsive on admission  9/4/2024 1322 by Leni Stinson RN  Outcome: Adequate for Discharge  9/3/2024 2340 by Bal Pacheco RN  Outcome: Progressing  Goal: Achieves maximal functionality and self care  9/4/2024 1322 by Leni Stinson RN  Outcome: Adequate for Discharge  9/3/2024 2340 by Bal Pacheco RN  Outcome: Progressing     Problem: Respiratory - Adult  Goal: Achieves optimal ventilation and oxygenation  9/4/2024 1322 by Leni Stinson RN  Outcome: Adequate for Discharge  9/3/2024 2340 by Bal Pacheco RN  Outcome: Progressing     Problem: Cardiovascular - Adult  Goal: Maintains optimal cardiac output and hemodynamic stability  9/4/2024 1322 by Leni Stinson RN  Outcome: Adequate for Discharge  9/3/2024 2340 by Bal Pacheco RN  Outcome: Progressing  Goal: Absence of cardiac dysrhythmias or at baseline  9/4/2024 1322 by Leni Stinson RN  Outcome: Adequate for Discharge  9/3/2024 2340 by Bal Pacheco RN  Outcome: Progressing     Problem: Gastrointestinal - Adult  Goal: Minimal or absence of nausea and 
  Problem: Discharge Planning  Goal: Discharge to home or other facility with appropriate resources  Outcome: Not Progressing  Flowsheets (Taken 8/29/2024 3219)  Discharge to home or other facility with appropriate resources: Identify barriers to discharge with patient and caregiver     Problem: Skin/Tissue Integrity  Goal: Absence of new skin breakdown  Description: 1.  Monitor for areas of redness and/or skin breakdown  2.  Assess vascular access sites hourly  3.  Every 4-6 hours minimum:  Change oxygen saturation probe site  4.  Every 4-6 hours:  If on nasal continuous positive airway pressure, respiratory therapy assess nares and determine need for appliance change or resting period.  Outcome: Not Progressing     Problem: Neurosensory - Adult  Goal: Achieves stable or improved neurological status  Description: Pt unresponsive on admission  Outcome: Not Progressing  Goal: Achieves maximal functionality and self care  Outcome: Not Progressing     Problem: Respiratory - Adult  Goal: Achieves optimal ventilation and oxygenation  Outcome: Not Progressing     Problem: Cardiovascular - Adult  Goal: Maintains optimal cardiac output and hemodynamic stability  Outcome: Not Progressing  Goal: Absence of cardiac dysrhythmias or at baseline  Outcome: Not Progressing     Problem: Gastrointestinal - Adult  Goal: Maintains adequate nutritional intake  Outcome: Not Progressing     Problem: Genitourinary - Adult  Goal: Absence of urinary retention  Outcome: Not Progressing     Problem: Metabolic/Fluid and Electrolytes - Adult  Goal: Electrolytes maintained within normal limits  Outcome: Not Progressing  Goal: Glucose maintained within prescribed range  Outcome: Not Progressing     
  Problem: Discharge Planning  Goal: Discharge to home or other facility with appropriate resources  Outcome: Progressing  Flowsheets (Taken 8/31/2024 2000 by Eva Thakur, RN)  Discharge to home or other facility with appropriate resources:   Identify barriers to discharge with patient and caregiver   Arrange for needed discharge resources and transportation as appropriate   Identify discharge learning needs (meds, wound care, etc)     Problem: Skin/Tissue Integrity  Goal: Absence of new skin breakdown  Description: 1.  Monitor for areas of redness and/or skin breakdown  2.  Assess vascular access sites hourly  3.  Every 4-6 hours minimum:  Change oxygen saturation probe site  4.  Every 4-6 hours:  If on nasal continuous positive airway pressure, respiratory therapy assess nares and determine need for appliance change or resting period.  Outcome: Progressing  Note: Turn and reposition, low air loss mattress     Problem: Neurosensory - Adult  Goal: Achieves stable or improved neurological status  Description: Pt unresponsive on admission  Outcome: Progressing  Flowsheets (Taken 8/31/2024 2000 by Eva Thakur, RN)  Achieves stable or improved neurological status:   Assess for and report changes in neurological status   Initiate measures to prevent increased intracranial pressure  Goal: Achieves maximal functionality and self care  Outcome: Progressing  Flowsheets (Taken 8/31/2024 2000 by Eva Thakur, RN)  Achieves maximal functionality and self care: Monitor swallowing and airway patency with patient fatigue and changes in neurological status     Problem: Respiratory - Adult  Goal: Achieves optimal ventilation and oxygenation  Outcome: Progressing  Flowsheets (Taken 8/30/2024 2008 by Eva Thakur, RN)  Achieves optimal ventilation and oxygenation: Assess for changes in respiratory status     Problem: Cardiovascular - Adult  Goal: Maintains optimal cardiac output and hemodynamic stability  Outcome: 
  Problem: Gastrointestinal - Adult  Goal: Maintains or returns to baseline bowel function  Outcome: Progressing  Flowsheets (Taken 8/30/2024 2008)  Maintains or returns to baseline bowel function:   Encourage oral fluids to ensure adequate hydration   Assess bowel function     Problem: Gastrointestinal - Adult  Goal: Maintains adequate nutritional intake  Outcome: Progressing  Flowsheets (Taken 8/30/2024 2008)  Maintains adequate nutritional intake: Monitor percentage of each meal consumed     Problem: Genitourinary - Adult  Goal: Absence of urinary retention  Outcome: Progressing  Flowsheets (Taken 8/30/2024 2008)  Absence of urinary retention: Monitor intake/output and perform bladder scan as needed     Problem: Genitourinary - Adult  Goal: Urinary catheter remains patent  Outcome: Progressing  Flowsheets (Taken 8/30/2024 2008)  Urinary catheter remains patent: Assess patency of urinary catheter     Problem: Metabolic/Fluid and Electrolytes - Adult  Goal: Electrolytes maintained within normal limits  Outcome: Progressing  Flowsheets (Taken 8/30/2024 2008)  Electrolytes maintained within normal limits: Monitor labs and assess patient for signs and symptoms of electrolyte imbalances     Problem: Safety - Adult  Goal: Free from fall injury  Outcome: Progressing  Flowsheets (Taken 8/30/2024 0346 by Kerrie Caballero, RN)  Free From Fall Injury: Instruct family/caregiver on patient safety     
Adult  Goal: Minimal or absence of nausea and vomiting  Outcome: Progressing  Flowsheets (Taken 8/31/2024 2000)  Minimal or absence of nausea and vomiting: Administer IV fluids as ordered to ensure adequate hydration     Problem: Gastrointestinal - Adult  Goal: Maintains or returns to baseline bowel function  Outcome: Progressing  Flowsheets (Taken 8/31/2024 2000)  Maintains or returns to baseline bowel function:   Assess bowel function   Encourage oral fluids to ensure adequate hydration     Problem: Genitourinary - Adult  Goal: Absence of urinary retention  Outcome: Progressing     Problem: Genitourinary - Adult  Goal: Urinary catheter remains patent  Outcome: Progressing     Problem: Metabolic/Fluid and Electrolytes - Adult  Goal: Electrolytes maintained within normal limits  Outcome: Progressing  Flowsheets (Taken 8/31/2024 2000)  Electrolytes maintained within normal limits:   Monitor labs and assess patient for signs and symptoms of electrolyte imbalances   Administer electrolyte replacement as ordered     
from ostomies     Problem: Genitourinary - Adult  Goal: Absence of urinary retention  Flowsheets (Taken 8/29/2024 2000)  Absence of urinary retention: Monitor intake/output and perform bladder scan as needed     Problem: Genitourinary - Adult  Goal: Urinary catheter remains patent  Flowsheets (Taken 8/29/2024 2000)  Urinary catheter remains patent: Assess patency of urinary catheter     Problem: Metabolic/Fluid and Electrolytes - Adult  Goal: Electrolytes maintained within normal limits  Flowsheets (Taken 8/29/2024 2000)  Electrolytes maintained within normal limits:   Monitor labs and assess patient for signs and symptoms of electrolyte imbalances   Administer electrolyte replacement as ordered   Monitor response to electrolyte replacements, including repeat lab results as appropriate     Problem: Metabolic/Fluid and Electrolytes - Adult  Goal: Hemodynamic stability and optimal renal function maintained  Flowsheets (Taken 8/29/2024 2000)  Hemodynamic stability and optimal renal function maintained:   Monitor labs and assess for signs and symptoms of volume excess or deficit   Monitor intake, output and patient weight     Problem: Metabolic/Fluid and Electrolytes - Adult  Goal: Glucose maintained within prescribed range  Flowsheets (Taken 8/29/2024 2000)  Glucose maintained within prescribed range:   Monitor blood glucose as ordered   Assess for signs and symptoms of hyperglycemia and hypoglycemia     Problem: Safety - Adult  Goal: Free from fall injury  Flowsheets (Taken 8/30/2024 0346)  Free From Fall Injury: Instruct family/caregiver on patient safety     Problem: Nutrition Deficit:  Goal: Optimize nutritional status  8/30/2024 0346 by Kerrie Caballero, RN  Flowsheets (Taken 8/29/2024 1439 by Moriah Ferguson, RD, LD)  Nutrient intake appropriate for improving, restoring, or maintaining nutritional needs: Assess nutritional status and recommend course of action

## 2024-09-04 NOTE — PROCEDURES
PROCEDURE NOTE  Date: 9/3/2024   Name: Tian Schmitz  YOB: 1940    Procedures  12 lead EKG completed. Results handed to Bal HENDERSON.

## 2024-09-04 NOTE — PROCEDURES
PROCEDURE NOTE  Date: 9/4/2024   Name: Tian Schmitz  YOB: 1940    Procedures  EKG completed for [employer], the times are as followed; 1:20, 1:21, 1:22, 1:23  Given to Radha

## 2024-09-04 NOTE — CARE COORDINATION
09/04/24 6:46 AM    Pt transferred to 3B27. Handoff report given to JUVENAL Fernandez CM  
8/30/24, 2:55 PM EDT    DISCHARGE ON GOING EVALUATION    Tian CORRALES [facility] day: 1  Location: -07/007-A Reason for admit: Bradycardia [R00.1]     Procedures:   8/29 Intubated at [facility]  8/29 TVP left subclavian  8/29 Extubated    Imaging since last note: none    Barriers to Discharge: Extubated yesterday. EP consulted. Plan for PPM Tuesday 9/3. Continues w/TVP.     On room air. Tmax 100.6. VPaced. Ox4. Follows commands. Dietitian consulted. PT/OT.  Intensivist, Cardiology, and EP following. Telemetry, TVP, harrell, ileostomy, SCDs. Levo @ 3 mcg/min, PO doxycycline, lovenox, Electrolyte replacement protocols.     PCP: Antony Garcia MD  Readmission Risk Score: 16    Patient Goals/Plan/Treatment Preferences: From home w/wife. Independent PTA and did not use any DME. Follows at [facility] for colon cancer, on Keytruda. Monitor for needs as course progresses.     
9/3/24, 2:31 PM EDT    DISCHARGE ON GOING EVALUATION    Tian CORRALES [facility] day: 5  Location: -07/007-A Reason for admit: Bradycardia [R00.1]     Procedures:   8/29 Intubated at [facility]  8/29 TVP left subclavian  8/29 Extubated    Imaging since last note:   9/1 Renal US: 1. Moderate right and mild to moderate left hydronephrosis is seen.  2. There is increased echogenicity of the renal cortices bilaterally which is  nonspecific but may be related to underlying medical renal disease.    Barriers to Discharge: Levo off 8/31. Plan for PPM today w/Dr De Jesus. Urology consulted for bilateral hydronephrosis; suspects is secondary to high volume urinary retention. Plan repeat renal US tomorrow, consider flomax.     On room air. Tmax 99.1. VPaced. Ox4. Follows commands. Dietitian consulted. PT/OT.  Intensivist, Cardiology, and EP following. Telemetry, TVP, harrell, ileostomy, SCDs. PO doxycycline, Electrolyte replacement protocols.     PCP: [facility] Garcia MD  Readmission Risk Score: 13.6    Patient Goals/Plan/Treatment Preferences: Home w/wife. Denies needs, declines HH.     
9/4/24, 1:45 PM EDT    Patient goals/plan/ treatment preferences discussed by  and .  Patient goals/plan/ treatment preferences reviewed with patient/ family.  Patient/ family verbalize understanding of discharge plan and are in agreement with goal/plan/treatment preferences.  Understanding was demonstrated using the teach back method.  AVS provided by RN at time of discharge, which includes all necessary medical information pertaining to the patients current course of illness, treatment, post-discharge goals of care, and treatment preferences.     Services At/After Discharge: None      Tian plans to return home with his spouse today.  He denies needs at this time.    
inserted with the tip of this catheter  projected over the region of the right ventricular apex. No pneumothorax is  seen.  2. Mild interstitial pneumonitis/edema/fibrosis both lung bases. Small patch  consolidation retrocardiac region left lung base.    Patient Goals/Plan/Treatment Preferences: From home w/wife. Independent PTA and did not use any DME. Follows at [facility] for colon cancer, on Keytruda. Monitor for needs as course progresses.     ICU Understanding Delirium pamphlet given to patient's family.      08/29/24 1420   Service Assessment   Patient Orientation Sedated  (on vent)   Cognition Other (see comment)  (Sedated on vent)   History Provided By Spouse   Primary Caregiver Self   Accompanied By/Relationship Wife Amelie and daughter Nancy   Support Systems Spouse/Significant Other;Children;Family Members   Patient's Healthcare Decision Maker is: Legal Next of Kin  (States has HCPOA paperwork; daughter Nancy HCPOA - asked family to bring paperwork in to be copied.)   PCP Verified by CM Yes   Prior Functional Level Independent in ADLs/IADLs   Current Functional Level Other (see comment)  (KOMAL; sedated on vent)   Can patient return to prior living arrangement Yes   Ability to make needs known: Other (see comment)  (KOMAL; sedated on vent)   Family able to assist with home care needs: Yes   Would you like for me to discuss the discharge plan with any other family members/significant others, and if so, who? No   Financial Resources Medicare;Other (Comment)  ([place] [facility] [place] [facility])   Community Resources None   Social/Functional History   Active  Yes   Discharge Planning   Type of Residence House   Living Arrangements Spouse/Significant Other   Current Services Prior To Admission None   Potential Assistance Needed Home Care   DME Ordered? No   Potential Assistance Purchasing Medications No   Type of Home Care Services None   Patient expects to be discharged to: House   Services At/After Discharge   Confirm

## 2024-09-04 NOTE — PROGRESS NOTES
Urology Progress Note    Reason for Consult:  Hydronephrosis and urinary retention   Requesting Physician:  Dr. Angelo Tam     History Obtained From:  patient, electronic medical record     Chief Complaint: Symptomatic Bradycartdia    Subjective: \"    Patient is sitting up in bed, voiding yellow urine via harrell+BM via illeostomy ambulating with assistance, tolerating regular diet, denies any nausea or vomiting. There are complaints of controlled pain at this time.    JAMEEL this am shows resolution of b/l hydronephrosis    Urologic IMPRESSION/Plan  Urinary Retention   Bilateral hydronephrosis - resolved  -BPH likely contributing   -Harrell catheter in place. Suspect bilateral hydronephrosis is secondary to high volume urinary retention.   -Urine culture from 2024 was negative for growth  -avoid constipation as this can make spontaneous voiding more difficult   -kidney function within normal limits and is at baseline.       -consider Flomax 0.4 mg daily if patient's BP allows  -discharge with harrell  OV in 1-2 wks to establish care and possible VT     Uro to follow peripherally, call with questions        Vitals:  /78   Pulse 81   Temp 97.5 °F (36.4 °C) (Oral)   Resp 18   Ht 1.854 m (6' 1\")   Wt 69.4 kg (153 lb)   SpO2 97%   BMI 20.19 kg/m²   Temp  Av.8 °F (37.1 °C)  Min: 97.5 °F (36.4 °C)  Max: 99.5 °F (37.5 °C)    Intake/Output Summary (Last 24 hours) at 2024 1106  Last data filed at 2024 0826  Gross per 24 hour   Intake 971.84 ml   Output 2530 ml   Net -1558.16 ml       Social History     Socioeconomic History    Marital status:      Spouse name: Not on file    Number of children: Not on file    Years of education: Not on file    Highest education level: Not on file   Occupational History    Not on file   Tobacco Use    Smoking status: Former     Types: Cigarettes    Smokeless tobacco: Never   Substance and Sexual Activity    Alcohol use: Never    Drug use: Never    
  CRITICAL CARE PROGRESS NOTE      Patient:  Tian Schmitz    Unit/Bed:4D-07/007-A  YOB: 1940  MRN: [id number]   PCP: Antony Garcia MD  Date of Admission: 8/29/2024  Chief Complaint:- symptomatic bradycardia    Assessment and Plan:    Symptomatic bradycardia: Likely secondary to high-grade AVB +/- CHB vs. Lyme's disease. Currently transvenously paced at 100 bpm. Patient reports no chest pain or SOB. 8/29 echocardiogram shows mildly reduced left ventricular systolic function, EF 45-50%, tricuspid valve shows trace regurgitation, IVC diameter is greater than 21 mm and decreases greater than 50% during inspiration. Currently on doxycycline 21-day course. Currently off pressor support. Cardiology on. Electrophysiology consulted; planning for permanent pacemaker placement next week.  Continue transvenous pacing at 100 bpm  Continue doxycycline (SOT 8/29 EOT 9/18)   Cardiology consulted; appreciate recs  EP consulted; PPM planned for next week  Magnesium check daily  Ionized calcium check tomorrow (9/1)  Monitor vitals, S/S of bradycardia  Systolic heart failure: 8/29 echocardiogram shows mildly reduced left ventricular systolic function, EF 45-50%. Patient not on GDTM; reports no cardiac history. Cardiology on.   Cardiology on; appreciate recs  Elevated troponins: Troponin initially 134, likely secondary to symptomatic bradycardia. Patient currently endorses no chest pain.   Monitor for S/S of ACS  Elevated LFTs (improving): Likely secondary to shock liver from symptomatic bradycardia. 8/31 albumin 2.6, alkaline phosphatase 112, AST 41, . Currently endorses no abdominal pain.   Repeat LFTs tomorrow (9/1)  Leukocytosis (improved): Likely secondary to heart block. WBC 8.4, down from 12.7. Currently afebrile. 8/29 chest x-ray shows mild interstitial pneumonitis/edema/fibrosis in both lung bases, small patch consolidation retrocardiac region left lung base. No sputum production.  Trend 
  CRITICAL CARE PROGRESS NOTE      Patient:  Tian Schmitz    Unit/Bed:4D-07/007-A  YOB: 1940  MRN: [id number]   PCP: Antony Garcia MD  Date of Admission: 8/29/2024    Chief Complaint:- symptomatic bradycardia    Assessment and Plan:    Symptomatic bradycardia: Likely 2/2 high-grade AV block versus Lyme disease, patient had been camping in the past few months.  Current transvenous pacer is set at 100 bpm, lower pacer rate resulted in runs of V. tach.  Patient currently reports no chest pain or SOB.  8/29 echo showed mildly reduced LV systolic function with EF 45 to 50%, trace tricuspid regurg.  Currently on doxycycline 21-day course for possible Lyme disease contributory component.  Currently off of pressor support.  Cardiology/electrophysiology consulted and following, planning for permanent pacemaker, likely Tuesday 9/3.  Transvenous pacer set at 100 bpm  Permanent pacer planned for placement on Tues 9/3 (Dr Salinas) NPO at midnight  Cont Doxycycline through 9/18 c/f Lyme disease  Systolic heart failure, NYHA class I: 8/29 TTE showed mildly reduced LV systolic function with EF 45 to 50%, patient reports no cardiac history and is not on home GDMT, cardiology consulted and following.  Cardiology following  Consider referral to [facility] heart failure clinic after discharge  Elevated troponin: suspected 2/2 bradycardia, no endorsement of chest pain currently.  No c/o chest pain, continue to monitor symptoms and physical exam  Elevated LFTs, improving: initially elevated, improving, AST WNL on 9/1, ALT trending towards normal limits.  Leukocytosis improved: Suspected 2/2 heart block, WBCs still WNL, afebrile.  8/29 CXR showed mild interstitial pneumonitis versus fibrosis in both bases.  No sputum production or cough.  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 
  CRITICAL CARE PROGRESS NOTE      Patient:  Tian Schmitz    Unit/Bed:4D-07/007-A  YOB: 1940  MRN: [id number]   PCP: Antony Garcia MD  Date of Admission: 8/29/2024  Chief Complaint:- symptomatic bradycardia    Assessment and Plan:    Symptomatic bradycardia: Likely secondary to high-grade AVB +/- CHB vs. Lyme's disease. Currently transvenously paced at 100 bpm. Patient reports no chest pain or SOB. 8/29 echocardiogram shows mildly reduced left ventricular systolic function, EF 45-50%, tricuspid valve shows trace regurgitation, IVC diameter is greater than 21 mm and decreases greater than 50% during inspiration. Currently on doxycycline 21-day course. On levophed 3 mcg. Cardiology and Electrophysiology consulted.  Continue transvenous pacing at 100 bpm  Continue doxycycline (SOT 8/29 EOT 9/18)   Cardiology consulted; appreciate recs  EP consulted for PPM evaluation if no reversible causes found   Monitor vitals, S/S of bradycardia  Systolic heart failure: 8/29 echocardiogram shows mildly reduced left ventricular systolic function, EF 45-50%. Patient not on GDTM; reports no cardiac history. Cardiology on.   Cardiology on; appreciate recs  Elevated troponins: Troponin initially 134, likely secondary to symptomatic bradycardia. Patient currently endorses no chest pain.   Monitor for S/S of ACS  Elevated LFTs (improving): Likely secondary to shock liver from symptomatic bradycardia. 8/29 albumin 2.9, alkaline phosphatase 129, AST 90, . Currently endorses no abdominal pain.   Repeat LFTs tomorrow (9/1)  Leukocytosis (improving): Likely secondary to heart block. WBC 12.7, down from 17. Currently afebrile. 8/29 chest x-ray shows mild interstitial pneumonitis/edema/fibrosis in both lung bases, small patch consolidation retrocardiac region left lung base. No sputum production.  Trend CBC  Monitor for S/S of acute infection  History of Splenic flexure adenocarcinom s/p cholectomy: Invaded into 
0725 Patient arrived to unit from [facility] as direct admission via ems. Patient transferred to ICU bed and placed on continuous ICU bedside monitor. Patient admitted for Bradycardia [R00.1]. Vitals obtained. See flowsheets. Patient's IV access includes lfa. Current infusions and rates of infusion include heparin,propofol and ns. Assessment completed by desirae. Two nurse skin assessment completed by desirae and frankie. See flowsheets for assessment details. Policies and procedures of ICU unable to be explained to patient at this time. Family member(s)/representative(s) present at time of admission include none. Patient rights explained to family member(s)/representatives and patient, as able. Patient/patient's family member(s)/representative(s) N/A to have physician notified of their admission. All questions posed by patient's family member(s)/representative(s) and patient answered at this time.    0725 pt being externally paced at ma 40 rate 70. Capture noted on monitor.stopped propofol,heparin and ns iv. Pt on vent to oral ett,no pupil reaction noted palpable weak pulses x4.no movement of arms to pain. Withdraws feet to pain.  0740 placed 16 fr og at 65 cm at lip.  0745  at bedside preparing for TVP. pt repositioned bandaid intact in lumbar area. Abrasion noted rt mid back.coccyx redden but blanches.  0759 TVP placed lt subclavin per .ma and rate adjusted.LABS SENT  Cxr ordered.  0830 Pt starting to move.reaching toward ett with hands restraints applied.  Bladder scan 360 ml. Applied external catheter.  0850 cxr taken.  0900 Family wife and daughter here. Updated on  pt condition and events since arrival. Explained icu visiting, and given phone number.  7914 icu provider in and spoke with family.    
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  Pacer placed 9.3.    ROS (12 point review of systems completed.  Pertinent positives noted. Otherwise ROS is negative) :     PMH:  Per HPI  SHX:    FHX: Noncontributory    Allergies: See above    Medications:     sodium chloride 20 mL/hr at 09/03/24 1306      vancomycin 1 mg/mL in NS irrigation  1,000 mg Irrigation Once    vancomycin  1,000 mg IntraVENous Once    polycarbophil  625 mg Oral BID    cetirizine  5 mg Oral Daily    pantoprazole  40 mg Oral QAM AC    Vitamin D  1,000 Units Oral Daily    [Held by provider] enoxaparin  40 mg SubCUTAneous Daily    sodium chloride flush  5-40 mL IntraVENous 2 times per day    doxycycline hyclate  100 mg Oral 2 times per day       Vital Signs:   /75   Pulse 87   Temp 98.4 °F (36.9 °C)   Resp 16   Ht 1.854 m (6' 1\")   Wt 69.4 kg (153 lb)   SpO2 98%   BMI 20.19 kg/m²      Intake/Output Summary (Last 24 hours) at 9/4/2024 0620  Last data filed at 9/4/2024 0544  Gross per 24 hour   Intake 971.84 ml   Output 2280 ml   Net -1308.16 ml        Physical Examination: General appearance - alert, well appearing, and in no distress  Mental status - alert, oriented to person, place, and time  Neck - supple, no significant adenopathy, no JVD, or carotid bruits  Chest - clear to auscultation, no wheezes, rales or rhonchi, symmetric air entry  Heart - normal rate, regular rhythm, normal S1, S2, no murmurs, rubs, clicks or gallops  Abdomen - soft, nontender, nondistended, no masses or organomegaly.  Ileostomy in place.  Neurological - alert, oriented, normal speech, no focal findings or movement disorder noted  Musculoskeletal - no joint tenderness, deformity or swelling  Extremities - 
[facility]  OCCUPATIONAL THERAPY MISSED TREATMENT NOTE  [facility] ICU 4D  4D-07/007-A      Date: 9/3/2024  Patient Name: Tian Schmitz        CSN: [id number]   : 1940  (84 y.o.)  Gender: male                REASON FOR MISSED TREATMENT:  OT attempted at this time, although pt had just returned to bed and declined therapy due to fatigue stating \"can we just wait until after the pacemaker later today, and just do it tomorrow?\" Will follow up .                 
Cardiology Progress Note      Patient:  Tian Schmitz  YOB: 1940  MRN: [id number]   Acct: [id number]  Admit Date:  8/29/2024  Primary Cardiologist:  Seen by Dr Macedo/Neal    CHIEF COMPLAINT: Bradycardia        HPI: This is a pleasant 84 y.o. male w/ PMHx colon CA s/p ileostomy who presents as a transfer from OSH for symptomatic bradycardia. Wife thought pt looked pale at home and he was c/o feeling weak, so she checked his pulse and his HR was in 20-30s. Called EMS and HR was 25 on arrival. Pt was unconscious, c/f seizures at one point. EKG showed high-degree AVB w/ c/f complete heart block. Was given Atropine 1x w/o improvement. Transcutaneous pacing was initiated at 70 bpm. Was intubated at OSH. Upon arrival to ICU he had TVP and central line placed. TVP rate increased to 100 as pt was having runs of VT, so rate was increased to outpace the VT.      H/o prior ED visit (8/14) for syncope while working outside at his cabin in [place]; HR on EMS arrival in 30s, quickly improved to 80s on transfer into [facility]. Denied prodromal sx. EKG at that time showed 1st deg AVB. Improved after IVF. Of note he was started on pembrolizumab 8/22 for colon CA.      Echo: No results found for this or any previous visit.       Subjective (Events in last 24 hours):   Pt afebrile over the last 24 hrs.  Urine and sputum cx NG.  Denies any CP or SOB.        8/31/2024  Pt denies any CP or SOB.  No recent infections.  Low grad temp elevation.  Off levophed.      Objective:   /68   Pulse 81   Temp 98.8 °F (37.1 °C)   Resp 19   Ht 1.854 m (6' 1\")   Wt 69.4 kg (153 lb)   SpO2 100%   BMI 20.19 kg/m²        TELEMETRY:paced    Physical Exam:  General Appearance: alert and oriented to person, place and time, in no acute distress  Cardiovascular: normal rate, regular rhythm, normal S1 and S2, no murmurs, rubs, clicks, or gallops, distal pulses intact, no carotid bruits, no JVD  Pulmonary/Chest: clear to 
Cardiology Progress Note      Patient:  Tian Schmitz  YOB: 1940  MRN: [id number]   Acct: [id number]  Admit Date:  8/29/2024  Primary Cardiologist: none  Seen by dr. Macedo     Per prior cardiology consult note-  CHIEF COMPLAINT: Bradycardia        HPI: This is a pleasant 84 y.o. male w/ PMHx colon CA s/p ileostomy who presents as a transfer from OSH for symptomatic bradycardia. Wife thought pt looked pale at home and he was c/o feeling weak, so she checked his pulse and his HR was in 20-30s. Called EMS and HR was 25 on arrival. Pt was unconscious, c/f seizures at one point. EKG showed high-degree AVB w/ c/f complete heart block. Was given Atropine 1x w/o improvement. Transcutaneous pacing was initiated at 70 bpm. Was intubated at OSH. Upon arrival to ICU he had TVP and central line placed. TVP rate increased to 100 as pt was having runs of VT, so rate was increased to outpace the VT.      H/o prior ED visit (8/14) for syncope while working outside at his cabin in [facility]; HR on EMS arrival in 30s, quickly improved to 80s on transfer into [facility]. Denied prodromal sx. EKG at that time showed 1st deg AVB. Improved after IVF. Of note he was started on pembrolizumab 8/22 for colon CA.        Subjective (Events in last 24 hours):   Pt in bed   Had PPM placement yesterday   Lt  upper chest area with dressing - dry and intact   Soreness to shoulder - pt understands PPM restrictions - neurovascular check to Lt arm / hand WNL     Tele paced 80   BP stable     PPM check WNL per rep     Pt very active man prior to cancer diagnosis in April - no anginal issues noted per pt     Objective:   /78   Pulse 81   Temp 97.5 °F (36.4 °C) (Oral)   Resp 18   Ht 1.854 m (6' 1\")   Wt 69.4 kg (153 lb)   SpO2 97%   BMI 20.19 kg/m²        TELEMETRY: paced 80     Physical Exam:  General Appearance: alert and oriented to person, place and time, in no acute distress  Cardiovascular: normal rate, regular 
Cardiology Progress Note      Patient:  Tian Schmitz  YOB: 1940  MRN: [id number]   Acct: [id number]  Admit Date:  8/29/2024  Primary Cardiologist:  Seen by Dr Macedo/Neal    CHIEF COMPLAINT: Bradycardia        HPI: This is a pleasant 84 y.o. male w/ PMHx colon CA s/p ileostomy who presents as a transfer from OSH for symptomatic bradycardia. Wife thought pt looked pale at home and he was c/o feeling weak, so she checked his pulse and his HR was in 20-30s. Called EMS and HR was 25 on arrival. Pt was unconscious, c/f seizures at one point. EKG showed high-degree AVB w/ c/f complete heart block. Was given Atropine 1x w/o improvement. Transcutaneous pacing was initiated at 70 bpm. Was intubated at OSH. Upon arrival to ICU he had TVP and central line placed. TVP rate increased to 100 as pt was having runs of VT, so rate was increased to outpace the VT.      H/o prior ED visit (8/14) for syncope while working outside at his cabin in [facility]; HR on EMS arrival in 30s, quickly improved to 80s on transfer into [facility]. Denied prodromal sx. EKG at that time showed 1st deg AVB. Improved after IVF. Of note he was started on pembrolizumab 8/22 for colon CA.      Echo: No results found for this or any previous visit.       Subjective (Events in last 24 hours):     Pt not having any issues.  Afebrile and WBC ct normal.        9/1/2024  Pt afebrile over the last 24 hrs.  Urine and sputum cx NG.  Denies any CP or SOB.        8/31/2024  Pt denies any CP or SOB.  No recent infections.  Low grad temp elevation.  Off levophed.      Objective:   BP (!) 97/50   Pulse 98   Temp 99.3 °F (37.4 °C)   Resp 18   Ht 1.854 m (6' 1\")   Wt 69.4 kg (153 lb)   SpO2 100%   BMI 20.19 kg/m²        TELEMETRY:paced    Physical Exam:  General Appearance: alert and oriented to person, place and time, in no acute distress  Cardiovascular: normal rate, regular rhythm, normal S1 and S2, no murmurs, rubs, clicks, or gallops, 
Comprehensive Nutrition Assessment    Type and Reason for Visit:  Initial, Consult (TF ordering and management)    Nutrition Recommendations/Plan:   Begin TF - Vital 1.2 20ml/hour increase 10ml every 4h as tolerated to goal of 60ml/hour  Additional free H20 per Physician  Monitoring renal status - may need Nepro but with ileostomy will start with peptide based Vital 1.2 - at goal 2369mg K+ and 1446mg phosphorus /24h     Malnutrition Assessment:  Malnutrition Status:  Severe malnutrition (08/29/24 1224)    Context:  Chronic Illness     Findings of the 6 clinical characteristics of malnutrition:  Energy Intake:  Mild decrease in energy intake (Comment) (per wife and daughter pt. appetite has improved over the past 2 months - takes Ensure ONS BID but small meals and has been dealing with altered taste with chemo; po was less than 75% of needs for ~ 2 months prior to CA Dx and pt. Was on home TF June/July 2024)  Weight Loss:  Greater than 7.5% over 3 months (# per wife - loss of ~13% past 3 months since surgery 5/2024)     Body Fat Loss:  Severe body fat loss Orbital, Fat Overlying Ribs   Muscle Mass Loss:  Severe muscle mass loss Temples (temporalis), Clavicles (pectoralis & deltoids), Scapula (trapezius)  Fluid Accumulation:  No significant fluid accumulation     Strength:  Not Performed    Nutrition Assessment:     Pt. severely malnourished AEB criteria listed above.  At risk for further nutritional compromise r/t [facility]; intubated 8/29; TVP 8/29; May 2024 had GI surgery at [facility] for newly Dx colon CA: resection of splenic flexure distal gastrectomy, proximal jejunal SBR, complete omentectomy, transverse colectomy, J tube placement, pedicled falciform ligament flap, umbilical hernia repair, and diverting loop ileostomy; current chemo treatments.  PMH includes: colon CA,ileostomy, Jtube for home TF (removed)       Nutrition Related Findings:    Pt. Report/Treatments/Miscellaneous: 
Comprehensive Nutrition Assessment    Type and Reason for Visit:  Reassess (PO/ supplement monitor)    Nutrition Recommendations/Plan:   Resume diet and ONS (Ensure Plus TID) post-procedure as appropriate  Consider MVI     Malnutrition Assessment:  Malnutrition Status:  Severe malnutrition (08/29/24 1224)    Context:  Chronic Illness     Findings of the 6 clinical characteristics of malnutrition:  Energy Intake:   (per wife and dtr pt. appetite has improved over the past 2 months - takes Ensure ONS BID but small meals and has been dealing with altered taste with chemo; po was less than 75% of needs for ~ 2 months prior to CA Dx and pt. Was on home TF June/July 2024)  Weight Loss:  Greater than 7.5% over 3 months (# per wife - loss of ~13% past 3 months since surgery 5/2024)     Body Fat Loss:  Severe body fat loss Orbital, Fat Overlying Ribs   Muscle Mass Loss:  Severe muscle mass loss Temples (temporalis), Clavicles (pectoralis & deltoids), Scapula (trapezius)  Fluid Accumulation:  No significant fluid accumulation     Strength:  Not Performed    Nutrition Assessment:     Pt. severely malnourished AEB criteria listed above.  At risk for further nutritional compromise r/t admit with bradycardia from [facility]; intubated /extubated 8/29; TVP 8/29; plan permanent pacemaker; May 2024 had GI surgery at [facility] for newly Dx colon CA: resection of splenic flexure distal gastrectomy, proximal jejunal SBR, complete omentectomy, transverse colectomy, J tube placement, pedicled falciform ligament flap, umbilical hernia repair,diverting loop ileostomy; current chemo treatments.  PMH includes: colon CA,ileostomy, Jtube for home TF (removed).        Nutrition Related Findings:    Pt. Report/Treatments/Miscellaneous: Patient seen earlier today and reports aware of being NPO for permanent pacemaker, reports prior to NPO was eating % of meals and good acceptance of Ensure Plus as drinks them prior to admit as well, reports 
[facility]   PROGRESS NOTE      Patient: Tain Schmitz  Room #: 4D-07/007-A            YOB: 1940  Age: 84 y.o.  Gender: male            Admit Date & Time: 8/29/2024  7:35 AM    Assessment:  Matt is being cared for on the ICU and told me that he is scheduled to have a pace-maker placed today.  He was in good spirits.  He had a visitor in the room with him.    Interventions:  I met with Matt and his visitor and offered an introduction to spiritual care.    Outcomes:  Matt expressed gratitude for the visit.    Plan:    Continue to offer spiritual support through presence and encouragement.    Electronically signed by Chaplain MORENA, on 9/3/2024 at 3:25 PM.  Spiritual Care Department  [facility]  [phone]    
[facility] Nurse  Progress Note       Tian Schmitz  AGE: 84 y.o.   GENDER: male  : 1940  UNIT: 4D-07/007-A  TODAY'S DATE:  2024  ADMISSION DATE: 2024  7:35 AM  Subjective:     Reason for [facility] Evaluation and Assessment: admitted with ostomy       Tian Schmitz is a 84 y.o. male referred by:   [] Physician/PA/APRN  [x] Nursing  [] Other:       Objective:     Bhavin Risk Score: Bhavin Scale Score: 9    Assessment:     Wound ostomy consulted for established ileostomy. Primary RN to change pouching system twice weekly and as needed for leaking. Staff to utilize patient supplies or supplies located on unit. Specialty pouching system items located in 7K supply. For assistance with leaking or difficult pouching system changes, call [phone].    Plan:     Treatment Recommendations:   Primary RN to change pouching system twice weekly.        Application of two Piece Colostomy/Ileostomy Pouch:  Assemble above supplies in order of application before removing pouch.  Cut a hole in the flange to fit the size of stoma. Remove paper backing.  Remove worn appliance by gently pulling away from skin and discard.  Wash skin with warm water, rinse and pat dry.  DO NOT USE SOAP!  Apply skin barrier wipe to peristomal skin and skin around wounds.  Apply protective ring to inner edge of flange OR to peristomal skin.   Center the flange around stoma.  Smooth the adhesive collar to abdomen.  Apply pouch.  Apply barrier extenders around outer edge of flange for added security. (If unable to locate barrier extenders, may omit.)  Empty when 1/3 full.  
Patient has been successfully weaned from Mechanical Ventilation.  Patient extubated and placed on room air.  Post extubation SpO2 is 99% with HR  100 bpm and RR 18 breaths/min.  Patient had no cough that was non-productive.  Extubation Well tolerated by patient..  
Pt is an 84y.o. male, propped up in bed visiting with his wife, in 4D-07. Matt shared 'I'm doing much better than I have been' with a smile on his face. He mentioned with a chuckle that, if  had seen him yesterday, he didn't know much of anything. His wife-a retired nurse-shared that they now have some answers and a plan is being formulated for his heart issues; they both expressed relief now knowing.  provided active listening, words of encouragement, conversation around pt health issue and prayer-met with gratitude by Matt and his wife.     08/30/24 1314   Encounter Summary   Encounter Overview/Reason Attempted Encounter;Spiritual/Emotional Needs   Service Provided For Patient and family together   Referral/Consult From Rounding   Last Encounter  08/30/24   Complexity of Encounter Low   Begin Time 1314   End Time  1324   Total Time Calculated 10 min   Spiritual/Emotional needs   Type Spiritual Support   Assessment/Intervention/Outcome   Assessment Calm;Coping;Hopeful;Peaceful   Intervention Active listening;Prayer (assurance of)/[place];Nurtured Hope;Explored/Affirmed feelings, thoughts, concerns;Discussed illness injury and it’s impact   Outcome Engaged in conversation;Expressed feelings, needs, and concerns;Expressed Gratitude;Receptive       
[facility]  PHYSICAL THERAPY MISSED TREATMENT NOTE  [facility] CCU-STEPDOWN 3B    Date: 2024  Patient Name: Tian Schmitz        MRN: [id number]   : 1940  (84 y.o.)  Gender: male   Referring Practitioner: Mino Haney DO  Diagnosis: bradycardia         REASON FOR MISSED TREATMENT:  Missed Treat.      BEATRIZ Farrar approved therapy session. Patient seated in BS chair upon PTA arrival and reports he will be discharged from hospital and leaving soon. Patient denies need of PT at this time as he reports participation with OT earlier this morning.       
Spoke with Dr. Dawkins at [facility] ER re;Tian    HX-colon cancer, ileostomy.   No cardiac hx.    Symptomatic bradycardia.    Wife thought he looked pale and he was feeling weak so she checked his pulse. HR- 20-30s.  EMS called.    Arrived pale and weak.  HR 25    EKG-mobitz 2, 3rd degree HB.    Labs pending.    Given atropine--still jak.  Transcutaneous pacing at 70.    16, 110/47, 100% RA    Accepted on behalf of intensivist.    [id number]  Called them for update.  Ihhrrgor=468  WBC=11.1  Lactic=6  Hemoglobin=11.3  Given 2L fluid-0.9 NS  CTA head and neck being done.  Given ativan, morphine, zofran.  Will give ASA, heparin drip  They are thinking he may be having some seizure like activity-  eyes rolling back, body shakes, HR drops, becomes pale, respirations decrease.           
Went over the AVS paperwork with patient and wife and answered all questions and concerns. Went over new medications and medications were sent home with patient. Went over follow up appointments. A leg bag was placed and extra supplies were given to the patient. Patient left with hospital transport via wheelchair in stable condition.   
Limits  Orientation Level: Oriented X4  Vision: Impaired  Vision Exceptions: Wears glasses at all times  Hearing: Exceptions to WFL  Hearing Exceptions: Bilateral hearing aid, Hard of hearing/hearing concerns       Pain: denies during session    Vitals: Blood Pressure: 110/70 at rest, 1298/62 at end of session  Oxygen: >90% on room air  Heart Rate: pacer    Social/Functional History:    Lives With: Spouse  Type of Home: House  Home Layout: Two level, Able to Live on Main level with bedroom/bathroom  Home Access: Stairs to enter with rails  Entrance Stairs - Number of Steps: 2-3 GURJIT  Entrance Stairs - Rails: Both  Home Equipment: Cane, Walker - Rolling, Wheelchair - Manual     Bathroom Shower/Tub: Walk-in shower  Bathroom Toilet: Handicap height  Bathroom Equipment: Shower chair       ADL Assistance: Independent  Homemaking Assistance: Independent  Ambulation Assistance: Independent  Transfer Assistance: Independent    Active : Yes  Occupation: Retired  Additional Comments: IND and active prior, family is supportive.    OBJECTIVE:  Range of Motion:  Bilateral Lower Extremity: WNL    Strength:  Bilateral Lower Extremity: WFL    Balance:  Static Sitting Balance:  Stand By Assistance  Dynamic Sitting Balance: Stand By Assistance  Static Standing Balance: Contact Guard Assistance, Minimal Assistance  Mild unsteadiness with initial stand, progressed to CGA  Bed Mobility:  Supine to Sit: Minimal Assistance, X 1, with head of bed flat, with verbal cues   Scooting: Contact Guard Assistance to scoot hips to EOB  Cues for hand placement, assist for trunk support  Transfers:  Sit to Stand: Contact Guard Assistance, X 1, cues for hand placement, with verbal cues  Stand to Sit:Contact Guard Assistance, X 1, cues for hand placement, with verbal cues  No AD, slow transitions at first  Ambulation:  Contact Guard Assistance, X 1, with cues for safety, with verbal cues   Distance: 160 feet  Surface: Level Tile  Device: No 
distress  Abdomen: soft, non-tender, non-distended, normal bowel sounds, no masses   Extremities: no cyanosis, clubbing or edema, pulse   Skin: warm and dry, no rash or erythema  Neurological: alert, oriented, normal speech, no focal findings or movement disorder noted    Medications:    polycarbophil  625 mg Oral BID    cetirizine  5 mg Oral Daily    pantoprazole  40 mg Oral QAM AC    Vitamin D  1,000 Units Oral Daily    enoxaparin  40 mg SubCUTAneous Daily    sodium chloride flush  5-40 mL IntraVENous 2 times per day    doxycycline hyclate  100 mg Oral 2 times per day      sodium chloride 15 mL/hr at 08/30/24 1742    norepinephrine 4 mcg/min (08/30/24 1742)     simethicone, 80 mg, Q6H PRN  sodium chloride flush, 5-40 mL, PRN  sodium chloride, , PRN  potassium chloride, 20 mEq, PRN   Or  potassium chloride, 10 mEq, PRN  magnesium sulfate, 2,000 mg, PRN  ondansetron, 4 mg, Q8H PRN   Or  ondansetron, 4 mg, Q6H PRN  polyethylene glycol, 17 g, Daily PRN  acetaminophen, 650 mg, Q6H PRN   Or  acetaminophen, 650 mg, Q6H PRN        Diagnostics:  EKG:   Encounter Date: 04/30/24   EKG 12 Lead   Result Value    Ventricular Rate 75    Atrial Rate 75    P-R Interval 202    QRS Duration 140    Q-T Interval 420    QTc Calculation (Bazett) 469    P Axis 41    R Axis -82    T Axis 67    Narrative    Normal sinus rhythm  Left axis deviation  Right bundle branch block  Septal infarct (cited on or before 21-APR-2024)  Abnormal ECG  When compared with ECG of 21-APR-2024 19:40,  Questionable change in initial forces of Septal leads  Confirmed by EVONNE STOKES MD ([id number]) on 5/1/2024 12:06:09 AM        ECHO: 8/29/2024    Left Ventricle: Mildly reduced left ventricular systolic function with a visually estimated EF of 45 - 50%. EF by 2D Simpsons Biplane is 45%. Left ventricle size is normal. Normal wall thickness. Normal wall motion.    Right Ventricle: Right ventricle size is normal. Lead present in the right ventricle.    Tricuspid Valve: 
supine in bed upon OT arrival and agreeable to eval. patient  A& O x 4. patient able to recall pacer precautions.    Pain:  reports L sided chest is sore from procedure on 9/3. No facial grimacing during eval    Vitals: Vitals not assessed per clinical judgement, see nursing flowsheet    Social/Functional History:  Lives With: Spouse  Type of Home: House  Home Layout: Two level, Able to Live on Main level with bedroom/bathroom, Performs ADL's on one level  Home Access: Stairs to enter with rails  Entrance Stairs - Number of Steps: 2-3 GURJIT  Entrance Stairs - Rails: Both  Home Equipment: Cane, Walker - Rolling, Wheelchair - Manual   Bathroom Shower/Tub: Walk-in shower  Bathroom Toilet: Handicap height  Bathroom Equipment: Shower chair  Bathroom Accessibility: Accessible    Receives Help From: Family  ADL Assistance: Independent  Homemaking Assistance: Independent  Ambulation Assistance: Independent  Transfer Assistance: Independent    Active : Yes  Occupation: Retired  Additional Comments: IND and active prior, family is supportive. patient's spouse is in good health to assist as needed.    VISION:Corrected    HEARING:  WFL    COGNITION: Impulsive    RANGE OF MOTION:  Right Upper Extremity: WFL  Left Upper Extremity:  Impaired - pacemaker precautions; WFL within precautions    STRENGTH:  Right Upper Extremity: WFL  Left Upper Extremity:  Not Tested    SENSATION:   WFL    ADL:   Footwear Management: Stand By Assistance.  To doff/don slipper sock seated EOB  Patient declined any other ADLs including oral care stated he will do later.  .    IADL:   Not Tested    BALANCE:  Sitting Balance:  Supervision.    Standing Balance: Contact Guard Assistance. With use of 2 w/w for support    BED MOBILITY:  Supine to Sit: Stand By Assistance demo good awareness to not pull with L UE to assist with task    TRANSFERS:  Sit to Stand:  Stand By Assistance. No unsteadiness in transition from sit to stand    FUNCTIONAL 
Intake, Supplement Intake  Physical Signs/Symptoms Outcomes: Biochemical Data, GI Status, Fluid Status or Edema, Hemodynamic Status, Nutrition Focused Physical Findings, Weight, Skin    Discharge Planning:    Too soon to determine     Moriah Ferguson RD, LD  Contact: [phone]     
feeling really good.  Patient endorses no chest pain or shortness of breath. Currently n.p.o. Currently on Levophed 3 mcg, plan  cc and doxycycline. Currently being transvenously paced at 100 bpm. ROS as noted below     8/31: Patient seen and evaluated at bedside. A&Ox4. Patent currently off pressor support saturating at 100% on room air.  Patient reports feeling really good and better than yesterday.  Patient endorses no chest pain or shortness of breath. Reports eating and drinking normally. Currently on doxycycline. Currently being transvenously paced at 100 bpm. ROS as noted below.\"    9/1: Patient seen and examined at bedside, ANO x 4.  Continuing transvenous pacing at 100 bpm, eating and drinking normally, bowel output through ileostomy bag consistent over past several days.  No increased abdominal pain    9/2: Patient seen and examined at bedside, AO x 4.  Continuing transvenous pacing, noted PVCs on telemetry, continued bowel output through ileostomy.  Will make patient n.p.o. at midnight in preparation for PPM tomorrow.      Past Medical History:   has no past medical history on file.  Family History:  family history is not on file.  Social History:   reports that he has quit smoking. His smoking use included cigarettes. He has never used smokeless tobacco. He reports that he does not drink alcohol and does not use drugs.    ROS patient denies chest pain, headache, dyspnea, fevers or chills.  Review of Systems    Scheduled Meds:   polycarbophil  625 mg Oral BID    cetirizine  5 mg Oral Daily    pantoprazole  40 mg Oral QAM AC    Vitamin D  1,000 Units Oral Daily    [Held by provider] enoxaparin  40 mg SubCUTAneous Daily    sodium chloride flush  5-40 mL IntraVENous 2 times per day    doxycycline hyclate  100 mg Oral 2 times per day     Continuous Infusions:   sodium chloride 20 mL/hr at 09/02/24 1419    norepinephrine Stopped (08/31/24 0044)       PHYSICAL EXAMINATION:  T:  99.  P:  90. RR:  21. B/P:  
mg IntraVENous Once    polycarbophil  625 mg Oral BID    cetirizine  5 mg Oral Daily    pantoprazole  40 mg Oral QAM AC    Vitamin D  1,000 Units Oral Daily    [Held by provider] enoxaparin  40 mg SubCUTAneous Daily    sodium chloride flush  5-40 mL IntraVENous 2 times per day    doxycycline hyclate  100 mg Oral 2 times per day     Continuous Infusions:   sodium chloride 20 mL/hr at 09/03/24 0009    norepinephrine Stopped (08/31/24 0044)       PHYSICAL EXAMINATION:  T:  99.1.  P:  98. RR:  17. B/P:  121/66.  FiO2:  RA. O2 Sat:  100.  I/O:  -2L   Body mass index is 20.19 kg/m².   GCS:   15  General: Patient AO x 4, stable on room air, TV paced at 100bpm  Eyes:  PERRLA. Nonicteric, no conjunctivitis, EOMs intact.   HENT: Normocephalic, atraumatic. Nares normal. Oral mucosa moist.  Hearing grossly intact.   Neck: Supple, with full range of motion. No gross JVD appreciated.  Respiratory:  Normal effort. Clear to auscultation, without rales or wheezes or rhonchi.  Cardiovascular: RRR, good S1/S2 No rubs, gallops, or murmurs. No lower extremity edema.   Abdomen: Soft, non-tender, non-distended.  Colostomy bag in place.  Musculoskeletal: No joint swelling or tenderness. Normal tone. No abnormal movements.   Skin: Warm and dry. No rashes or lesions.  Neurologic:  No focal sensory/motor deficits in the upper or lower extremities.      Psychiatric: Alert and oriented, normal insight and thought content.   Capillary Refill: Brisk,< 3 seconds.  Peripheral Pulses: +2 palpable, equal bilaterally.     Data: (All radiographs, tracings, PFTs, and imaging are personally viewed and interpreted unless otherwise noted).   Sodium 137  WBC 8.7  Telemetry shows sinus rhythm rate high 90s although frequent PVCs  Imaging shows renal ultrasound showed mild to moderate left and moderate right hydronephrosis.  Echo 8/29/2024 showed EF 45 to 50%, normal RV size, mildly reduced LV systolic function.  EKG sinus rhythm    Seen with

## 2024-09-04 NOTE — BRIEF OP NOTE
Brief Postoperative Note    Date:   9/3/2024  Patient name: Tian Schmitz  YOB: 1940  Sex: male   MRN:   [id number]    PCP: Antony Garcia MD     Procedure:Dual Chamber pacemaker implantation    Pre-Op Diagnosis: Complete AV block    Post-Op Diagnosis: Same    Surgeon: Yovani De Jesus MD, MRCP, FACC, FHRS    Assistant: Juan CHA     Anesthesia/sedation:  Local lidocaine/fentanyl and midazolam as needed.     Estimated Blood Loss (mL): less than 50     Complications: None    Recommendations:  See Epic orders.  NO HEPARIN.  Bed rest for 4 hours.   Keep pocket site dry.  No shower for 7 days ((sponge bath and tub bath OK).  ICE packs locally.  Monitor site for bleeding or swelling.   Pressure dressing x 24 hours.  Chest x-ray PA and lateral views.  Follow up in device clinic in 1 week.       Electronically signed by Yovani De Jesus MD, FACC, FHRS on 9/3/2024 at 8:44 PM

## 2024-09-06 LAB — B BURGDOR.VLSE1+PEPC10 AB SER IA-ACNC: 0.13 IV

## 2024-09-10 LAB
BACTERIA SPEC RESP CULT: ABNORMAL
BACTERIA SPEC RESP CULT: ABNORMAL
GRAM STN SPEC: ABNORMAL
ORGANISM: ABNORMAL

## 2024-09-11 ENCOUNTER — OFFICE VISIT (OUTPATIENT)
Dept: UROLOGY | Age: 84
End: 2024-09-11
Payer: MEDICARE

## 2024-09-11 VITALS — WEIGHT: 134.7 LBS | RESPIRATION RATE: 16 BRPM | BODY MASS INDEX: 17.85 KG/M2 | HEIGHT: 73 IN

## 2024-09-11 DIAGNOSIS — R33.9 URINARY RETENTION: Primary | ICD-10-CM

## 2024-09-11 DIAGNOSIS — N13.39 OTHER HYDRONEPHROSIS: ICD-10-CM

## 2024-09-11 DIAGNOSIS — R33.8 BENIGN PROSTATIC HYPERPLASIA WITH URINARY RETENTION: ICD-10-CM

## 2024-09-11 DIAGNOSIS — N40.1 BENIGN PROSTATIC HYPERPLASIA WITH URINARY RETENTION: ICD-10-CM

## 2024-09-11 PROCEDURE — 1123F ACP DISCUSS/DSCN MKR DOCD: CPT | Performed by: NURSE PRACTITIONER

## 2024-09-11 PROCEDURE — G8419 CALC BMI OUT NRM PARAM NOF/U: HCPCS | Performed by: NURSE PRACTITIONER

## 2024-09-11 PROCEDURE — 1036F TOBACCO NON-USER: CPT | Performed by: NURSE PRACTITIONER

## 2024-09-11 PROCEDURE — 99204 OFFICE O/P NEW MOD 45 MIN: CPT | Performed by: NURSE PRACTITIONER

## 2024-09-11 PROCEDURE — 1111F DSCHRG MED/CURRENT MED MERGE: CPT | Performed by: NURSE PRACTITIONER

## 2024-09-11 PROCEDURE — G8427 DOCREV CUR MEDS BY ELIG CLIN: HCPCS | Performed by: NURSE PRACTITIONER

## 2024-09-11 RX ORDER — TAMSULOSIN HYDROCHLORIDE 0.4 MG/1
0.4 CAPSULE ORAL DAILY
Qty: 90 CAPSULE | Refills: 3 | Status: SHIPPED | OUTPATIENT
Start: 2024-09-11

## 2024-09-11 ASSESSMENT — ENCOUNTER SYMPTOMS
COUGH: 0
SHORTNESS OF BREATH: 0
ABDOMINAL PAIN: 0

## 2024-09-16 ENCOUNTER — NURSE ONLY (OUTPATIENT)
Dept: CARDIOLOGY CLINIC | Age: 84
End: 2024-09-16

## 2024-09-16 ENCOUNTER — OFFICE VISIT (OUTPATIENT)
Dept: UROLOGY | Age: 84
End: 2024-09-16
Payer: MEDICARE

## 2024-09-16 VITALS — BODY MASS INDEX: 17.76 KG/M2 | WEIGHT: 134 LBS | HEIGHT: 73 IN | RESPIRATION RATE: 16 BRPM

## 2024-09-16 DIAGNOSIS — R33.8 BENIGN PROSTATIC HYPERPLASIA WITH URINARY RETENTION: ICD-10-CM

## 2024-09-16 DIAGNOSIS — N13.39 OTHER HYDRONEPHROSIS: ICD-10-CM

## 2024-09-16 DIAGNOSIS — N40.1 BENIGN PROSTATIC HYPERPLASIA WITH URINARY RETENTION: ICD-10-CM

## 2024-09-16 DIAGNOSIS — Z95.0 PACEMAKER: Primary | ICD-10-CM

## 2024-09-16 DIAGNOSIS — R33.9 URINARY RETENTION: Primary | ICD-10-CM

## 2024-09-16 LAB
BILIRUBIN, URINE: NEGATIVE
BLOOD URINE, POC: NEGATIVE
CHARACTER, URINE: CLEAR
COLOR, UA: YELLOW
GLUCOSE URINE: NEGATIVE MG/DL
KETONES, URINE: NEGATIVE
LEUKOCYTE CLUMPS, URINE: NEGATIVE
NITRITE, URINE: NEGATIVE
PH, URINE: 6 (ref 5–9)
POST VOID RESIDUAL (PVR): 34 ML
PROTEIN, URINE: NEGATIVE MG/DL
SPECIFIC GRAVITY UA: 1.02 (ref 1–1.03)
UROBILINOGEN, URINE: 0.2 EU/DL (ref 0–1)

## 2024-09-16 PROCEDURE — G8427 DOCREV CUR MEDS BY ELIG CLIN: HCPCS | Performed by: NURSE PRACTITIONER

## 2024-09-16 PROCEDURE — 81003 URINALYSIS AUTO W/O SCOPE: CPT | Performed by: NURSE PRACTITIONER

## 2024-09-16 PROCEDURE — G8419 CALC BMI OUT NRM PARAM NOF/U: HCPCS | Performed by: NURSE PRACTITIONER

## 2024-09-16 PROCEDURE — 1111F DSCHRG MED/CURRENT MED MERGE: CPT | Performed by: NURSE PRACTITIONER

## 2024-09-16 PROCEDURE — 99214 OFFICE O/P EST MOD 30 MIN: CPT | Performed by: NURSE PRACTITIONER

## 2024-09-16 PROCEDURE — 1123F ACP DISCUSS/DSCN MKR DOCD: CPT | Performed by: NURSE PRACTITIONER

## 2024-09-16 PROCEDURE — 1036F TOBACCO NON-USER: CPT | Performed by: NURSE PRACTITIONER

## 2024-09-16 PROCEDURE — 51798 US URINE CAPACITY MEASURE: CPT | Performed by: NURSE PRACTITIONER

## 2024-09-16 ASSESSMENT — ENCOUNTER SYMPTOMS
COUGH: 0
ABDOMINAL PAIN: 0
SHORTNESS OF BREATH: 0

## 2024-10-01 NOTE — PATIENT INSTRUCTIONS
Your Provider for Today: Truong Denny PA-C  Your nurses for today: Dominga     You may receive a survey regarding the care you received during your visit.  Your input is valuable to us.  We encourage you to complete and return your survey.  We hope you will choose us in the future for your healthcare needs.

## 2024-10-02 ENCOUNTER — OFFICE VISIT (OUTPATIENT)
Dept: CARDIOLOGY CLINIC | Age: 84
End: 2024-10-02

## 2024-10-02 VITALS
BODY MASS INDEX: 17.89 KG/M2 | DIASTOLIC BLOOD PRESSURE: 70 MMHG | HEART RATE: 88 BPM | WEIGHT: 135 LBS | HEIGHT: 73 IN | SYSTOLIC BLOOD PRESSURE: 118 MMHG

## 2024-10-02 DIAGNOSIS — Z95.0 PACEMAKER: Primary | ICD-10-CM

## 2024-10-02 DIAGNOSIS — Z86.79 HISTORY OF VENTRICULAR TACHYCARDIA: ICD-10-CM

## 2024-10-02 RX ORDER — METOPROLOL SUCCINATE 25 MG/1
25 TABLET, EXTENDED RELEASE ORAL DAILY
Qty: 90 TABLET | Refills: 3 | Status: SHIPPED | OUTPATIENT
Start: 2024-10-02

## 2024-10-02 NOTE — PROGRESS NOTES
Pt here for check up     Denies chest pain     C/O SOB at times    
[facility]    Transportation Needs    Received from [facility]    Social Connections   Housing Stability: High Risk (4/30/2024)    Housing Stability Vital Sign     Unable to Pay for Housing in the Last Year: No     Number of Places Lived in the Last Year: 10     Unstable Housing in the Last Year: No       No family history on file.    Blood pressure 118/70, pulse 88, height 1.854 m (6' 1\"), weight 61.2 kg (135 lb).    General:   Well developed, well nourished  Lungs:   Clear to auscultation  Heart:    Normal S1 S2, No murmur, rubs, or gallops  Abdomen:   Soft, non tender, no organomegalies, positive bowel sounds  Extremities:   No edema, no cyanosis, good peripheral pulses  Neurological:   Awake, alert, oriented. No obvious focal deficits  Musculoskeletal:  No obvious deformities    EKG:     Lab Results   Component Value Date/Time    MG 1.9 09/03/2024 03:31 AM       FASTING LIPID PANEL:No results found for: \"CHOL\", \"HDL\", \"TRIG\"    HbA1c  No results found for: \"LABA1C\"    TSH  Lab Results   Component Value Date/Time    TSH 3.020 08/29/2024 08:30 PM        Diagnosis Orders   1. medtronic dual pacemaker        2. History of ventricular tachycardia                Above findings and plan of care were discussed with patient in details, patient's questions were answered.     Disposition:    Continue metoprolol 25 mg twice a day for history of arrhythmia.    Continue all other current medications and with constant vigilance to changes in symptoms and also any potential side effects.  All medication side effects were discussed in details.    Advised more plant based nutrition/meditarrean diet     Advised patient to call office or seek immediate medical attention if there is any new onset of  any chest pain, sob, palpitations, lightheadedness, dizziness, orthopnea, PND or pedal edema.     Return in about 6 months (around 4/2/2025).    Follow up with Dr Macedo as scheduled or sooner if needed    (Please note that

## 2024-10-23 ENCOUNTER — TELEPHONE (OUTPATIENT)
Dept: CARDIOLOGY CLINIC | Age: 84
End: 2024-10-23

## 2024-10-23 RX ORDER — SODIUM CHLORIDE 9 MG/ML
INJECTION, SOLUTION INTRAVENOUS CONTINUOUS
Status: DISCONTINUED | OUTPATIENT
Start: 2024-10-23 | End: 2024-10-24 | Stop reason: HOSPADM

## 2024-10-23 RX ORDER — SODIUM CHLORIDE 0.9 % (FLUSH) 0.9 %
5-40 SYRINGE (ML) INJECTION EVERY 12 HOURS SCHEDULED
Status: DISCONTINUED | OUTPATIENT
Start: 2024-10-23 | End: 2024-10-24 | Stop reason: HOSPADM

## 2024-10-23 RX ORDER — SODIUM CHLORIDE 0.9 % (FLUSH) 0.9 %
5-40 SYRINGE (ML) INJECTION PRN
Status: DISCONTINUED | OUTPATIENT
Start: 2024-10-23 | End: 2024-10-24 | Stop reason: HOSPADM

## 2024-10-23 RX ORDER — SODIUM CHLORIDE 9 MG/ML
25 INJECTION, SOLUTION INTRAVENOUS PRN
Status: DISCONTINUED | OUTPATIENT
Start: 2024-10-23 | End: 2024-10-24 | Stop reason: HOSPADM

## 2024-10-23 NOTE — TELEPHONE ENCOUNTER
Pre op Risk Assessment    Procedure colonoscopy and EGD  Physician Maciel   Date of surgery/procedure 10/24/2024    Last OV 10/02/2024 with [facility] consult with Remington on 08/29/2024  Last Stress ?  Last Echo 08/29/2024  Last Cath ?  Last Stent ?  Is patient on blood thinners None  Hold Meds/how many days     Fax clearance to [phone]

## 2024-10-24 ENCOUNTER — ANESTHESIA (OUTPATIENT)
Dept: ENDOSCOPY | Age: 84
End: 2024-10-24
Payer: MEDICARE

## 2024-10-24 ENCOUNTER — ANESTHESIA EVENT (OUTPATIENT)
Dept: ENDOSCOPY | Age: 84
End: 2024-10-24
Payer: MEDICARE

## 2024-10-24 ENCOUNTER — APPOINTMENT (OUTPATIENT)
Dept: ENDOSCOPY | Age: 84
End: 2024-10-24
Attending: SURGERY
Payer: MEDICARE

## 2024-10-24 ENCOUNTER — HOSPITAL ENCOUNTER (OUTPATIENT)
Age: 84
Setting detail: OUTPATIENT SURGERY
Discharge: HOME | End: 2024-10-24
Attending: SURGERY | Admitting: SURGERY
Payer: MEDICARE

## 2024-10-24 VITALS
HEART RATE: 80 BPM | OXYGEN SATURATION: 100 % | TEMPERATURE: 96.5 F | WEIGHT: 141.8 LBS | SYSTOLIC BLOOD PRESSURE: 134 MMHG | RESPIRATION RATE: 16 BRPM | BODY MASS INDEX: 18.79 KG/M2 | HEIGHT: 73 IN | DIASTOLIC BLOOD PRESSURE: 77 MMHG

## 2024-10-24 PROCEDURE — 3700000001 HC ADD 15 MINUTES (ANESTHESIA): Performed by: SURGERY

## 2024-10-24 PROCEDURE — 2580000003 HC RX 258: Performed by: SURGERY

## 2024-10-24 PROCEDURE — 7100000011 HC PHASE II RECOVERY - ADDTL 15 MIN: Performed by: SURGERY

## 2024-10-24 PROCEDURE — 3609017100 HC EGD: Performed by: SURGERY

## 2024-10-24 PROCEDURE — 7100000010 HC PHASE II RECOVERY - FIRST 15 MIN: Performed by: SURGERY

## 2024-10-24 PROCEDURE — 2580000003 HC RX 258: Performed by: NURSE ANESTHETIST, CERTIFIED REGISTERED

## 2024-10-24 PROCEDURE — 6360000002 HC RX W HCPCS: Performed by: NURSE ANESTHETIST, CERTIFIED REGISTERED

## 2024-10-24 PROCEDURE — 88305 TISSUE EXAM BY PATHOLOGIST: CPT

## 2024-10-24 PROCEDURE — 3700000000 HC ANESTHESIA ATTENDED CARE: Performed by: SURGERY

## 2024-10-24 PROCEDURE — 3609010600 HC COLONOSCOPY POLYPECTOMY SNARE/COLD BIOPSY: Performed by: SURGERY

## 2024-10-24 PROCEDURE — 2709999900 HC NON-CHARGEABLE SUPPLY: Performed by: SURGERY

## 2024-10-24 RX ORDER — SODIUM CHLORIDE 9 MG/ML
INJECTION, SOLUTION INTRAVENOUS
Status: DISCONTINUED | OUTPATIENT
Start: 2024-10-24 | End: 2024-10-24 | Stop reason: SDUPTHER

## 2024-10-24 RX ADMIN — PROPOFOL 20 MG: 10 INJECTION, EMULSION INTRAVENOUS at 08:41

## 2024-10-24 RX ADMIN — PROPOFOL 20 MG: 10 INJECTION, EMULSION INTRAVENOUS at 08:45

## 2024-10-24 RX ADMIN — PROPOFOL 30 MG: 10 INJECTION, EMULSION INTRAVENOUS at 08:20

## 2024-10-24 RX ADMIN — SODIUM CHLORIDE: 9 INJECTION, SOLUTION INTRAVENOUS at 07:10

## 2024-10-24 RX ADMIN — PROPOFOL 20 MG: 10 INJECTION, EMULSION INTRAVENOUS at 08:19

## 2024-10-24 RX ADMIN — SODIUM CHLORIDE: 9 INJECTION, SOLUTION INTRAVENOUS at 08:03

## 2024-10-24 RX ADMIN — PROPOFOL 20 MG: 10 INJECTION, EMULSION INTRAVENOUS at 08:09

## 2024-10-24 RX ADMIN — PROPOFOL 20 MG: 10 INJECTION, EMULSION INTRAVENOUS at 08:24

## 2024-10-24 RX ADMIN — PROPOFOL 20 MG: 10 INJECTION, EMULSION INTRAVENOUS at 08:37

## 2024-10-24 RX ADMIN — PROPOFOL 20 MG: 10 INJECTION, EMULSION INTRAVENOUS at 08:07

## 2024-10-24 RX ADMIN — PROPOFOL 20 MG: 10 INJECTION, EMULSION INTRAVENOUS at 08:33

## 2024-10-24 RX ADMIN — PROPOFOL 20 MG: 10 INJECTION, EMULSION INTRAVENOUS at 08:48

## 2024-10-24 RX ADMIN — PROPOFOL 40 MG: 10 INJECTION, EMULSION INTRAVENOUS at 08:06

## 2024-10-24 RX ADMIN — PROPOFOL 20 MG: 10 INJECTION, EMULSION INTRAVENOUS at 08:10

## 2024-10-24 RX ADMIN — PROPOFOL 30 MG: 10 INJECTION, EMULSION INTRAVENOUS at 08:30

## 2024-10-24 ASSESSMENT — PAIN - FUNCTIONAL ASSESSMENT
PAIN_FUNCTIONAL_ASSESSMENT: NONE - DENIES PAIN

## 2024-10-24 NOTE — PROGRESS NOTES
Colonoscopy completed. Tolerated well. Photos taken. Polyp removed per hot snare and biopsies taken from same site. One specimen jar labeled and sent to lab. Scope [id number].

## 2024-10-24 NOTE — ANESTHESIA PRE PROCEDURE
03:31 AM    CALCIUM 7.8 09/03/2024 03:31 AM    BILITOT 0.9 09/03/2024 03:31 AM    ALKPHOS 96 09/03/2024 03:31 AM    AST 13 09/03/2024 03:31 AM    ALT 45 09/03/2024 03:31 AM       POC Tests: No results for input(s): \"POCGLU\", \"POCNA\", \"POCK\", \"POCCL\", \"POCBUN\", \"POCHEMO\", \"POCHCT\" in the last 72 hours.    Coags:   Lab Results   Component Value Date/Time    INR 1.14 09/03/2024 03:31 AM    APTT 71.0 08/29/2024 08:43 AM       HCG (If Applicable): No results found for: \"PREGTESTUR\", \"PREGSERUM\", \"HCG\", \"HCGQUANT\"     ABGs: No results found for: \"PHART\", \"PO2ART\", \"GEZ8DDF\", \"NFA2GKS\", \"BEART\", \"H0ULIADU\"     Type & Screen (If Applicable):  No results found for: \"LABABO\"      Drug/Infectious Status (If Applicable):  No results found for: \"HIV\", \"HEPCAB\"    COVID-19 Screening (If Applicable): No results found for: \"COVID19\"        Anesthesia Evaluation  Patient summary reviewed   no history of anesthetic complications:   Airway: Mallampati: I  TM distance: >3 FB   Neck ROM: full  Mouth opening: > = 3 FB   Dental:          Pulmonary:Negative Pulmonary ROS and normal exam                               Cardiovascular:Negative CV ROS    (+) pacemaker:      ECG reviewed      Echocardiogram reviewed                  Neuro/Psych:   Negative Neuro/Psych ROS              GI/Hepatic/Renal: Neg GI/Hepatic/Renal ROS            Endo/Other:    (+) malignancy/cancer.                 Abdominal: normal exam            Vascular: negative vascular ROS.         Other Findings:             Anesthesia Plan      MAC     ASA 3       Induction: intravenous.      Anesthetic plan and risks discussed with patient and spouse.      Plan discussed with attending.                    Camelia Oneil, RASHARD - CRNA   10/24/2024

## 2024-10-24 NOTE — BRIEF OP NOTE
Brief Postoperative Note      Patient: Tian Schmitz  YOB: 1940  MRN: [id number]    Date of Procedure: 10/24/2024    Pre-Op Diagnosis Codes:      * Personal history of colon cancer [Z85.038]    Post-Op Diagnosis: 1.Anastomotic Mass  2.Significant Diverticulosis 3.Nl EGD with gastrojejuenostomy       Procedure(s):  EGD W/ ANESTHESIA  COLONOSCOPY POLYPECTOMY SNARE/BIOPSY    Surgeon(s):  Kedar St MD    Assistant:      Anesthesia: Monitor Anesthesia Care    Estimated Blood Loss (mL): 3 ml    Complications: none    Specimens:   ID Type Source Tests Collected by Time Destination   A : biopsy colonic anastamosis mass Tissue Colon SURGICAL PATHOLOGY Kedar St MD 10/24/2024 0835        Implants:        Drains:   Ileostomy/Jejunostomy RLQ Ileostomy (Active)       Findings:  Infection Present At Time Of Surgery (PATOS) (choose all levels that have infection present):  No infection present  Other Findings: see op note    Electronically signed by Kedar St MD on 10/24/2024 at 8:55 AM

## 2024-10-24 NOTE — ANESTHESIA POSTPROCEDURE EVALUATION
Department of Anesthesiology  Postprocedure Note    Patient: Tian Schmitz  MRN: [id number]  YOB: 1940  Date of evaluation: 10/24/2024    Procedure Summary       Date: 10/24/24 Room / Location: [place] [address] / [facility]    Anesthesia Start: 0803 Anesthesia Stop: 0851    Procedures:       EGD W/ ANESTHESIA (Esophagus)      COLONOSCOPY POLYPECTOMY SNARE/BIOPSY Diagnosis:       Personal history of colon cancer      (Personal history of colon cancer [Z85.038])    Surgeons: Kedar St MD Responsible Provider: Terry Quintanilla MD    Anesthesia Type: MAC ASA Status: 3            Anesthesia Type: No value filed.    Carter Phase I: Carter Score: 10    Carter Phase II:      Anesthesia Post Evaluation    Patient location during evaluation: bedside  Patient participation: complete - patient cannot participate  Level of consciousness: awake and alert  Pain score: 0  Airway patency: patent  Nausea & Vomiting: no vomiting and no nausea  Cardiovascular status: blood pressure returned to baseline and hemodynamically stable  Respiratory status: acceptable, nasal cannula, nonlabored ventilation and spontaneous ventilation  Hydration status: stable        No notable events documented.

## 2024-10-24 NOTE — OP NOTE
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    POSTOPERATIVE DIAGNOSES:    1. Significant sigmoid diverticulosis.  2. Polyp/mass right at the colocolic anastomosis.  3. Normal gastric pouch with gastrojejunostomy.    OPERATIONS:    1. Colonoscopy with partial polypectomy and biopsy of anastomotic polyp/mass.  2. EGD.    ANESTHESIA:  Per nurse anesthesia with Diprivan.    COMPLICATIONS:  None.    INDICATIONS FOR PROCEDURE:  The patient is an 84-year-old male who had presented in May of this year with a large splenic flexure mass.  It appeared to have fistulized and was attached to the stomach.  He was recommended to go to [facility].  He went to [facility], where he apparently underwent an en bloc resection of the transverse splenic flexure of colon with a reanastomosis of the colon, en bloc removal of the stomach with a gastrojejunostomy, but also an end ileostomy formation.  [facility] would like to have him have a colonoscopy prior to consideration of takedown of the ileostomy.  I am also performing an EGD.    FINDINGS:  The patient had a lot of stool residual in the colon, but we were able to navigate around that and passed the scope to the colocolic anastomosis.  We were then able to pass the scope into the cecum.  At the anastomosis that appeared to be side-to-side, there was a polyp/mass.  It felt firm.  I was able to snare some of

## 2024-10-29 ENCOUNTER — TELEPHONE (OUTPATIENT)
Dept: CARDIOLOGY CLINIC | Age: 84
End: 2024-10-29

## 2024-10-29 NOTE — TELEPHONE ENCOUNTER
Pt 's wife calling.  Pt recently had bowel surgery.  They now want to go in an hook up the ileostomy.  They are asking for clearance from Cardiology.

## 2024-10-30 ENCOUNTER — TELEPHONE (OUTPATIENT)
Dept: ADMINISTRATIVE | Age: 84
End: 2024-10-30

## 2024-10-30 NOTE — TELEPHONE ENCOUNTER
Maci Murphy1 minute ago (8:55 AM)     VK  Pt spouse is calling with a fax number for Dr. Garrett is [phone].  Please advise pt at [phone]

## 2024-10-30 NOTE — TELEPHONE ENCOUNTER
Spoke with Amelie.  Pt wife on HIPAA.    Form in Cast box.  Amelie will call back with fax number to make sure I have the right fax number.

## 2024-10-30 NOTE — TELEPHONE ENCOUNTER
Pt spouse is calling with a fax number for Dr. Garrett is [phone].  Please advise pt at [phone]

## 2024-10-30 NOTE — TELEPHONE ENCOUNTER
Wife calling with updated fax number, [phone]. Both are good fax numbers, but this number may go through easier.

## 2024-12-17 ENCOUNTER — NURSE ONLY (OUTPATIENT)
Dept: CARDIOLOGY CLINIC | Age: 84
End: 2024-12-17

## 2024-12-17 DIAGNOSIS — Z95.0 PACEMAKER: Primary | ICD-10-CM

## 2025-03-21 PROCEDURE — 93294 REM INTERROG EVL PM/LDLS PM: CPT | Performed by: INTERNAL MEDICINE

## 2025-03-21 PROCEDURE — 93296 REM INTERROG EVL PM/IDS: CPT | Performed by: INTERNAL MEDICINE

## 2025-04-02 ENCOUNTER — OFFICE VISIT (OUTPATIENT)
Dept: CARDIOLOGY CLINIC | Age: 85
End: 2025-04-02
Payer: MEDICARE

## 2025-04-02 VITALS
DIASTOLIC BLOOD PRESSURE: 88 MMHG | BODY MASS INDEX: 23.27 KG/M2 | WEIGHT: 171.8 LBS | HEIGHT: 72 IN | HEART RATE: 88 BPM | SYSTOLIC BLOOD PRESSURE: 142 MMHG

## 2025-04-02 DIAGNOSIS — Z95.0 PACEMAKER: Primary | ICD-10-CM

## 2025-04-02 DIAGNOSIS — Z86.79 HISTORY OF VENTRICULAR TACHYCARDIA: ICD-10-CM

## 2025-04-02 PROCEDURE — G8420 CALC BMI NORM PARAMETERS: HCPCS | Performed by: PHYSICIAN ASSISTANT

## 2025-04-02 PROCEDURE — 99214 OFFICE O/P EST MOD 30 MIN: CPT | Performed by: PHYSICIAN ASSISTANT

## 2025-04-02 PROCEDURE — 1036F TOBACCO NON-USER: CPT | Performed by: PHYSICIAN ASSISTANT

## 2025-04-02 PROCEDURE — G8427 DOCREV CUR MEDS BY ELIG CLIN: HCPCS | Performed by: PHYSICIAN ASSISTANT

## 2025-04-02 PROCEDURE — 1123F ACP DISCUSS/DSCN MKR DOCD: CPT | Performed by: PHYSICIAN ASSISTANT

## 2025-04-02 PROCEDURE — 1159F MED LIST DOCD IN RCRD: CPT | Performed by: PHYSICIAN ASSISTANT

## 2025-04-02 RX ORDER — METOPROLOL SUCCINATE 25 MG/1
25 TABLET, EXTENDED RELEASE ORAL DAILY
Qty: 90 TABLET | Refills: 3 | Status: SHIPPED | OUTPATIENT
Start: 2025-04-02

## 2025-04-02 NOTE — PROGRESS NOTES
[facility] [place] [facility]  [facility] CARDIOLOGY  [address]  [address]  [place] [address]  Dept: [phone]  Dept Fax: [phone]  Loc: [phone]    Chief Complaint   Patient presents with    6 Month Follow-Up   Office visit 10/2/2024  Patient presents for follow-up appointment after recent permanent pacemaker placement.  Patient states he has been doing well since his pacemaker insertion.  He denies any chest pain, shortness of breath or palpitations.  Cardiologist:  Dr. Macedo  History of Present Illness  The patient is an 84-year-old male with a history of a pacemaker and paroxysmal ventricular tachycardia, presenting for a 6-month follow-up.    Overall good health is reported, with a significant weight gain of 40 pounds. This weight gain is attributed to an improved appetite following recovery from colon cancer and subsequent surgeries. Chemotherapy with Keytruda was administered every 6 weeks, and a colonoscopy is scheduled for next week.    Occasional skipped heartbeats are experienced, which are believed to be managed by the pacemaker. The heart rate typically hovers around 80 beats per minute. A pacemaker check is scheduled for 12/16/2025. Current heart management includes metoprolol.             General:   No fever, no chills, No fatigue or weight loss  Pulmonary:    No dyspnea, no wheezing  Cardiac:    Denies recent chest pain   GI:     No nausea or vomiting, no abdominal pain  Neuro:    No dizziness or light headedness  Musculoskeletal:  No recent active issues  Extremities:   No edema, good peripheral pulses      Past Medical History:   Diagnosis Date    Cancer (HCC)     History of blood transfusion     medtronic dual pacemaker 09/16/2024       Allergies   Allergen Reactions    Tamsulosin Dizziness or Vertigo       Current Outpatient Medications   Medication Sig Dispense Refill    metoprolol succinate (TOPROL XL) 25 MG extended release tablet Take 1 tablet by

## 2025-04-02 NOTE — PATIENT INSTRUCTIONS
You may receive a survey regarding the care you received during your visit. We encourage you to complete and return your survey, as your input is valuable to us. We hope you will choose us in the future for your healthcare needs. Thank you!    Your Medical Assistant today: Jerry  Thank you for coming to our office! It was a pleasure to serve you.

## (undated) DEVICE — C315HIS02 OD7FR ID5.4FR L40CM C315

## (undated) DEVICE — HI-TORQUE VERSACORE MODIFIED J GUIDE WIRE SYSTEM 145 CM: Brand: HI-TORQUE VERSACORE

## (undated) DEVICE — TOOL SLITTER LD UNIV ADJ

## (undated) DEVICE — 4F (1.0MM ID) X 9CM STIFF4F (1.0 MICRO-STICK®INTRODUCER SE WITH NITINOL GUIDEWIREWITH NITIN WITH RADIOPAQUE TIPWITH RADIOPAQ: Brand: MICRO-STICK SETMICRO-STICK SET

## (undated) DEVICE — INTRODUCER SHTH L13CM OD7FR SH ORNG HUB SEAMLESS SAFSHTH

## (undated) DEVICE — GLOVE ORTHO 8   MSG9480

## (undated) DEVICE — PACK PROC SURG PACEMKR